# Patient Record
Sex: FEMALE | Race: BLACK OR AFRICAN AMERICAN | NOT HISPANIC OR LATINO | Employment: FULL TIME | URBAN - METROPOLITAN AREA
[De-identification: names, ages, dates, MRNs, and addresses within clinical notes are randomized per-mention and may not be internally consistent; named-entity substitution may affect disease eponyms.]

---

## 2017-05-11 ENCOUNTER — OFFICE VISIT (OUTPATIENT)
Dept: FAMILY MEDICINE | Facility: CLINIC | Age: 37
End: 2017-05-11
Payer: COMMERCIAL

## 2017-05-11 VITALS
DIASTOLIC BLOOD PRESSURE: 80 MMHG | OXYGEN SATURATION: 100 % | TEMPERATURE: 97 F | HEIGHT: 64 IN | BODY MASS INDEX: 18.1 KG/M2 | SYSTOLIC BLOOD PRESSURE: 115 MMHG | HEART RATE: 70 BPM | RESPIRATION RATE: 16 BRPM | WEIGHT: 106 LBS

## 2017-05-11 DIAGNOSIS — R30.0 DYSURIA: ICD-10-CM

## 2017-05-11 DIAGNOSIS — M54.42 CHRONIC MIDLINE LOW BACK PAIN WITH LEFT-SIDED SCIATICA: ICD-10-CM

## 2017-05-11 DIAGNOSIS — B37.0 ORAL THRUSH: ICD-10-CM

## 2017-05-11 DIAGNOSIS — G89.29 CHRONIC MIDLINE LOW BACK PAIN WITH LEFT-SIDED SCIATICA: ICD-10-CM

## 2017-05-11 DIAGNOSIS — B20 HUMAN IMMUNODEFICIENCY VIRUS (HIV) DISEASE (H): Primary | ICD-10-CM

## 2017-05-11 DIAGNOSIS — N30.00 ACUTE CYSTITIS WITHOUT HEMATURIA: ICD-10-CM

## 2017-05-11 LAB
ALBUMIN UR-MCNC: NEGATIVE MG/DL
APPEARANCE UR: CLEAR
BACTERIA #/AREA URNS HPF: ABNORMAL /HPF
BILIRUB UR QL STRIP: NEGATIVE
COLOR UR AUTO: YELLOW
GLUCOSE UR STRIP-MCNC: NEGATIVE MG/DL
HGB UR QL STRIP: ABNORMAL
KETONES UR STRIP-MCNC: NEGATIVE MG/DL
LEUKOCYTE ESTERASE UR QL STRIP: ABNORMAL
NITRATE UR QL: NEGATIVE
NON-SQ EPI CELLS #/AREA URNS LPF: ABNORMAL /LPF
PH UR STRIP: 6 PH (ref 5–7)
RBC #/AREA URNS AUTO: ABNORMAL /HPF (ref 0–2)
SP GR UR STRIP: 1.02 (ref 1–1.03)
URN SPEC COLLECT METH UR: ABNORMAL
UROBILINOGEN UR STRIP-ACNC: 0.2 EU/DL (ref 0.2–1)
WBC #/AREA URNS AUTO: ABNORMAL /HPF (ref 0–2)

## 2017-05-11 PROCEDURE — 87086 URINE CULTURE/COLONY COUNT: CPT | Performed by: INTERNAL MEDICINE

## 2017-05-11 PROCEDURE — 81001 URINALYSIS AUTO W/SCOPE: CPT | Performed by: INTERNAL MEDICINE

## 2017-05-11 PROCEDURE — 99203 OFFICE O/P NEW LOW 30 MIN: CPT | Performed by: INTERNAL MEDICINE

## 2017-05-11 RX ORDER — SULFAMETHOXAZOLE/TRIMETHOPRIM 800-160 MG
1 TABLET ORAL 2 TIMES DAILY
Qty: 6 TABLET | Refills: 0 | Status: SHIPPED | OUTPATIENT
Start: 2017-05-11 | End: 2017-05-14

## 2017-05-11 RX ORDER — NYSTATIN 100000/ML
500000 SUSPENSION, ORAL (FINAL DOSE FORM) ORAL 4 TIMES DAILY
Qty: 280 ML | Refills: 1 | Status: SHIPPED | OUTPATIENT
Start: 2017-05-11 | End: 2017-05-25

## 2017-05-11 ASSESSMENT — ENCOUNTER SYMPTOMS
VOMITING: 0
PALPITATIONS: 0
NAUSEA: 0
FREQUENCY: 0
COUGH: 0
ABDOMINAL PAIN: 0
CHILLS: 0
WEIGHT LOSS: 0
HEMATURIA: 0
FLANK PAIN: 0
DIARRHEA: 0
DYSURIA: 1
SORE THROAT: 0
BLOOD IN STOOL: 0
FEVER: 0
SHORTNESS OF BREATH: 0

## 2017-05-11 NOTE — NURSING NOTE
"Chief Complaint   Patient presents with     Medication Problem   HIV meds making her feel different mentally    initial /80 (BP Location: Left arm, Cuff Size: Adult Regular)  Pulse 70  Temp 97  F (36.1  C) (Oral)  Resp 16  Ht 5' 3.5\" (1.613 m)  Wt 106 lb (48.1 kg)  SpO2 100%  BMI 18.48 kg/m2 Estimated body mass index is 18.48 kg/(m^2) as calculated from the following:    Height as of this encounter: 5' 3.5\" (1.613 m).    Weight as of this encounter: 106 lb (48.1 kg).  BP completed using cuff size: regular.  L  arm      Health Maintenance that is potentially due pending provider review:  NONE    n/a    Etienne Montesinos ma  "

## 2017-05-11 NOTE — MR AVS SNAPSHOT
After Visit Summary   5/11/2017    Krystina Kinney    MRN: [id number]           Patient Information     Date Of Birth          1980        Visit Information        Provider Department      5/11/2017 9:15 AM Open, Assignments; Johnny Vinson MD [facility]        Today's Diagnoses     Human immunodeficiency virus (HIV) disease (H)    -  1    Oral thrush        Dysuria        Chronic midline low back pain with left-sided sciatica          Care Instructions    Follow up with Infectious Disease specialist.    Follow up with Physical Therapy and Occupational Therapy.    Call doctor if your urination symptoms persist/worsens, or if you develop new symptoms.        Follow-ups after your visit        Additional Services     [facility] PT, HAND, AND CHIROPRACTIC REFERRAL       **This order will print in the [facility] Scheduling Office**    Physical Therapy, Hand Therapy and Chiropractic Care are available through:    *[facility]  *[facility]  *[facility]    Call one number to schedule at any of the above locations: [phone].    Your provider has referred you to: Physical Therapy at [facility] or [facility]    Indication/Reason for Referral: Low Back Pain  Onset of Illness: years  Therapy Orders: Evaluate and Treat  Special Programs: as recommended by therapist    Special Request:     Tiff Matthew      Additional Comments for the Therapist or Chiropractor:     Please be aware that coverage of these services is subject to the terms and limitations of your health insurance plan.  Call member services at your health plan with any benefit or coverage questions.      Please bring the following to your appointment:    *Your personal calendar for scheduling future appointments  *Comfortable clothing            INFECTIOUS DISEASE REFERRAL       Your provider has referred you to: [facility]: [facility]  [place] [phone]    [email or web address]    Please be aware that coverage of these services is subject to the terms and limitations of your health insurance plan.  Call member services at your health plan with any benefit or coverage questions.      Please bring the following with you to your appointment:    (1) Any X-Rays, CTs or MRIs which have been performed.  Contact the facility where they were done to arrange for  prior to your scheduled appointment.    (2) List of current medications   (3) This referral request   (4) Any documents/labs given to you for this referral            OCCUPATIONAL THERAPY REFERRAL       *This therapy referral will be filtered to a centralized scheduling office at [facility] and the patient will receive a call to schedule an appointment at a [facility] location most convenient for them. *     [facility] provides Occupational Therapy evaluation and treatment and many specialty services across the [facility] system.  If requesting a specialty program, please choose from the list below.    If you have not heard from the scheduling office within 2 business days, please call [phone] for all locations, with the exception of [place], please call [phone].     Treatment: Evaluation & Treatment  Special Instructions/Modalities: assess for capability for work  Special Programs: workability assessment    Please be aware that coverage of these services is subject to the terms and limitations of your health insurance plan.  Call member services at your health plan with any benefit or coverage questions.      **Note to Provider:  If you are referring outside of [facility] for the therapy appointment, please list the name of the location in the  special instructions  above, print the referral and give to the patient to schedule the appointment.                  Who to contact     If you have questions or need follow up information about today's clinic  "visit or your schedule please contact [facility] directly at [phone].  Normal or non-critical lab and imaging results will be communicated to you by MyChart, letter or phone within 4 business days after the clinic has received the results. If you do not hear from us within 7 days, please contact the clinic through [facility] or phone. If you have a critical or abnormal lab result, we will notify you by phone as soon as possible.  Submit refill requests through [facility] or call your pharmacy and they will forward the refill request to us. Please allow 3 business days for your refill to be completed.          Additional Information About Your Visit        MyChart Information     [facility] lets you send messages to your doctor, view your test results, renew your prescriptions, schedule appointments and more. To sign up, go to [email or web address] . Click on \"Log in\" on the left side of the screen, which will take you to the Welcome page. Then click on \"Sign up Now\" on the right side of the page.     You will be asked to enter the access code listed below, as well as some personal information. Please follow the directions to create your username and password.     Your access code is: [email or web address]  Expires: 2017 10:23 AM     Your access code will  in 90 days. If you need help or a new code, please call your [facility] clinic or [phone].        Care EveryWhere ID     This is your Care EveryWhere ID. This could be used by other organizations to access your [facility] medical records  [id number]        Your Vitals Were     Pulse Temperature Respirations Height Pulse Oximetry BMI (Body Mass Index)    70 97  F (36.1  C) (Oral) 16 5' 3.5\" (1.613 m) 100% 18.48 kg/m2       Blood Pressure from Last 3 Encounters:   17 115/80    Weight from Last 3 Encounters:   17 106 lb (48.1 kg)              We Performed the Following     [facility] PT, HAND, AND CHIROPRACTIC REFERRAL     INFECTIOUS DISEASE " REFERRAL     OCCUPATIONAL THERAPY REFERRAL     UA reflex to Microscopic and Culture          Today's Medication Changes          These changes are accurate as of: 5/11/17 10:23 AM.  If you have any questions, ask your nurse or doctor.               Start taking these medicines.        Dose/Directions    nystatin 711688 UNIT/ML suspension   Commonly known as:  MYCOSTATIN   Used for:  Oral thrush   Started by:  Johnny Vinson MD        Dose:  372348 Units   Take 5 mLs (500,000 Units) by mouth 4 times daily for 14 days   Quantity:  280 mL   Refills:  1            Where to get your medicines      These medications were sent to [facility] [place], [place] - [address]  [address], [facility] [address]     Phone:  [phone]     efavirenz-emtrictabine-tenofovir 600-200-300 MG per tablet    nystatin [id number] UNIT/ML suspension                Primary Care Provider    None Specified       No primary provider on file.        Thank you!     Thank you for choosing [facility]  for your care. Our goal is always to provide you with excellent care. Hearing back from our patients is one way we can continue to improve our services. Please take a few minutes to complete the written survey that you may receive in the mail after your visit with us. Thank you!             Your Updated Medication List - Protect others around you: Learn how to safely use, store and throw away your medicines at www.disposemymeds.org.          This list is accurate as of: 5/11/17 10:23 AM.  Always use your most recent med list.                   Brand Name Dispense Instructions for use    efavirenz-emtrictabine-tenofovir 600-200-300 MG per tablet    ATRIPLA    30 tablet    Take 1 tablet by mouth At Bedtime       nystatin [id number] UNIT/ML suspension    MYCOSTATIN    280 mL    Take 5 mLs (500,000 Units) by mouth 4 times daily for 14 days       UNKNOWN MED DOSAGE

## 2017-05-11 NOTE — PATIENT INSTRUCTIONS
Follow up with Infectious Disease specialist.    Follow up with Physical Therapy and Occupational Therapy.    Call doctor if your urination symptoms persist/worsens, or if you develop new symptoms.

## 2017-05-11 NOTE — PROGRESS NOTES
"HPI Comments:   Patient used to follow up at [facility], but her insurance coverage reportedly changed and she would need to follow up within our network.    She has a history of HIV and is currently on Atripla.  She has been on this medication for the past 2 months and she complains of unpleasant side effects such as nightmares. Would like to switch to a different medication. Has not recently seen an infectious disease specialist.    Also requesting a disability form filled out. She cites her chronic low back pain secondary to degenerative disc disease as the cause for her inability to work. She was undergoing physical therapy which was helping but this stopped given the patient's change in insurance. The pain radiates to her left lower extremity. No urinary/bowel incontinence. No saddle anesthesia. No weakness/numbness of the left leg.      Past Medical History:   Diagnosis Date     Human immunodeficiency virus (HIV) disease (H) 5/11/2017       Review of Systems   Constitutional: Positive for malaise/fatigue. Negative for chills, fever and weight loss.   HENT: Negative for congestion and sore throat.    Respiratory: Negative for cough and shortness of breath.    Cardiovascular: Negative for chest pain and palpitations.   Gastrointestinal: Negative for abdominal pain, blood in stool, diarrhea, melena, nausea and vomiting.   Genitourinary: Positive for dysuria. Negative for flank pain, frequency, hematuria and urgency.   Skin: Negative for rash.       /80 (BP Location: Left arm, Cuff Size: Adult Regular)  Pulse 70  Temp 97  F (36.1  C) (Oral)  Resp 16  Ht 5' 3.5\" (1.613 m)  Wt 106 lb (48.1 kg)  SpO2 100%  BMI 18.48 kg/m2      Physical Exam   Constitutional: She is oriented to person, place, and time. No distress.   HENT:   Mouth/Throat: No oropharyngeal exudate.   (+) oral thrush   Eyes: Conjunctivae are normal. Pupils are equal, round, and reactive to light.   Neck: No thyromegaly present. "   Cardiovascular: Normal rate, regular rhythm and normal heart sounds.    Pulmonary/Chest: Effort normal and breath sounds normal. No respiratory distress.   Abdominal: Soft. There is no tenderness.   Lymphadenopathy:     She has no cervical adenopathy.   Neurological: She is alert and oriented to person, place, and time. GCS score is 15.   Psychiatric: Mood and affect normal.   Vitals reviewed.        ICD-10-CM    1. Human immunodeficiency virus (HIV) disease (H) B20 INFECTIOUS DISEASE REFERRAL     efavirenz-emtrictabine-tenofovir (ATRIPLA) 600-200-300 MG per tablet   2. Chronic midline low back pain with left-sided sciatica M54.42 OCCUPATIONAL THERAPY REFERRAL    G89.29 [facility] PT, HAND, AND CHIROPRACTIC REFERRAL   3. Oral thrush B37.0 nystatin (MYCOSTATIN) 752016 UNIT/ML suspension   4. Dysuria R30.0 UA reflex to Microscopic and Culture         Patient Instructions   Follow up with Infectious Disease specialist.    Follow up with Physical Therapy and Occupational Therapy.    Call doctor if your urination symptoms persist/worsens, or if you develop new symptoms.

## 2017-05-12 LAB
BACTERIA SPEC CULT: NORMAL
MICRO REPORT STATUS: NORMAL
SPECIMEN SOURCE: NORMAL

## 2017-05-24 ENCOUNTER — TRANSFERRED RECORDS (OUTPATIENT)
Dept: HEALTH INFORMATION MANAGEMENT | Facility: CLINIC | Age: 37
End: 2017-05-24

## 2017-05-25 ENCOUNTER — TELEPHONE (OUTPATIENT)
Dept: FAMILY MEDICINE | Facility: CLINIC | Age: 37
End: 2017-05-25

## 2017-05-25 DIAGNOSIS — N30.00 ACUTE CYSTITIS WITHOUT HEMATURIA: ICD-10-CM

## 2017-05-25 RX ORDER — SULFAMETHOXAZOLE/TRIMETHOPRIM 800-160 MG
1 TABLET ORAL 2 TIMES DAILY
Qty: 6 TABLET | Refills: 0 | OUTPATIENT
Start: 2017-05-25

## 2017-05-25 NOTE — TELEPHONE ENCOUNTER
I would need more information before I could refill this  It is not on her medication list, but she may need it for PCP prophylaxis  She may get it from her ID doctor  Can you call the patient and get more information

## 2017-05-25 NOTE — TELEPHONE ENCOUNTER
Fax refill from [facility] for Sulfamethoxazole    Med not pended as it was not on active list    Sulfamethoxazole      Last Written Prescription Date:  5/11/17 Ended 5/14/17  Last Fill Quantity: 6,   # refills: 0  Last Office Visit with G, CORIEP or [facility] prescribing provider: 5/11/17  Future Office visit:       Routing refill request to provider for review/approval because:  Drug not active on patient's medication list    Marivel Jacome RT(R)

## 2017-06-07 ENCOUNTER — TELEPHONE (OUTPATIENT)
Dept: FAMILY MEDICINE | Facility: CLINIC | Age: 37
End: 2017-06-07

## 2017-06-07 NOTE — TELEPHONE ENCOUNTER
Chronic Illness Visit    Today's Date:  2017   Patient's Name: Lee Craft   Patient's :  1973     History:     Chief Complaint   Patient presents with    Follow Up Chronic Condition     pt here for follow up chronic conditions    Nail Problem     pt c/o having toe nail problems questionable fungus       Lee Craft is a 37 y.o. female presenting for a follow up of Chronic Illness. Migraine Headaches    Patient reports unilateral migraine with photophobia, sensitivity to sound, and nausea/vomiting  Denies missing work or other activities due to headaches  Headaches are self resolving  Taking the following meds for this problem: stable on zomig and imitrex prn    Depression    Patient denies new stressors  Sypmtoms are stable since last visit. Patient is currently on the following for depression and doing well: effexor  Denies side effects. Patient is not in counseling. Denies Any thoughts of harming herself or others. Denies excessive drug or alcohol use. Patient reports a supportive and safe home environment.     Bilateral Toe Nail/Foot Fungus    Onset: 2-3 months  Reports she is very active and runs  She also gets regular pedicures  She noticed discolorated and brittle nails   Her skin is also dry/cracked and scaly  No home treatments    Past Medical History   Diagnosis Date    Asthma     Contact dermatitis and other eczema, due to unspecified cause      ezcema    Depression     Headache      migraines-[facility] with Neuro [facility]    Heart murmur     HX OTHER MEDICAL      menieres disease    Hypothyroid     Nodule of vagina 2016     nodule vaginal cuff- repeat US in 3-6 mths    OJAN on CPAP     Thyroid disease      HYPOTHYROIDISM     Past Surgical History   Procedure Laterality Date    Hx hysterectomy       also uterine polyp    Pr lap,cholecystectomy N/A 08/15/2016     Dr. Alvaro Sequeira Pr abdomen surgery proc unlisted      Hx cholecystectomy  2016     Social I left message for Gretchen at [place] [facility] to call back and get more specifics about what information she is wanting.     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  venlafaxine-SR (EFFEXOR XR) 75 mg capsule Take 1 Cap by mouth daily.  SUMAtriptan (IMITREX) 50 mg tablet Take 1 Tab by mouth once as needed for Migraine for up to 1 dose.  ZOLMitriptan (ZOMIG) 5 mg tablet Take 1 Tab by mouth as needed for Migraine.  terbinafine HCl (LAMISIL) 250 mg tablet Take 1 Tab by mouth daily.  linaclotide (LINZESS) 145 mcg cap capsule Take 145 mcg by mouth Daily (before breakfast).  multivitamin (ONE A DAY) tablet Take 1 Tab by mouth daily.  ibuprofen (MOTRIN) 800 mg tablet Take 1 Tab by mouth three (3) times daily as needed for Pain.  albuterol (PROVENTIL HFA, VENTOLIN HFA, PROAIR HFA) 90 mcg/actuation inhaler Take 2 puffs by inhalation every four (4) hours as needed for Wheezing or Shortness of Breath.  docusate sodium (COLACE) 100 mg capsule Take 100 mg by mouth two (2) times a day.     oxyCODONE-acetaminophen (PERCOCET) 5-325 mg per tablet 1-2 tablets every 4-6 hours prn pain    estradiol (MINIVELLE) 0.05 mg/24 hr 1 patch by TransDERmal route Every Saturday. No current facility-administered medications for this visit. Constitutional: negative for fevers, chills, sweats and fatigue  Respiratory: negative for cough, sputum or wheezing  Cardiovascular: negative for chest pain, chest pressure/discomfort, dyspnea  Gastrointestinal: negative for nausea, vomiting, diarrhea, constipation and abdominal pain  Musculoskeletal:negative for myalgias and arthralgias  Neurological: negative for headaches and dizziness  Behavioral/Psych: negative for anxiety and depression    Physical Assessment:   VS:    Visit Vitals    /87 (BP 1 Location: Left arm, BP Patient Position: Sitting)    Pulse 92    Temp 98 °F (36.7 °C) (Oral)    Resp 20    Ht 5' 3\" (1.6 m)    Wt 211 lb (95.7 kg)    SpO2 99%    BMI 37.38 kg/m2       Lab Results   Component Value Date/Time    Sodium 142 08/12/2016 01:30 PM    Potassium 4.1 08/12/2016 01:30 PM    Chloride 106 08/12/2016 01:30 PM    CO2 28 08/12/2016 01:30 PM    Anion gap 8 08/12/2016 01:30 PM    Glucose 113 08/12/2016 01:30 PM    BUN 10 08/12/2016 01:30 PM    Creatinine 0.71 08/12/2016 01:30 PM    BUN/Creatinine ratio 14 08/12/2016 01:30 PM    GFR est AA >60 08/12/2016 01:30 PM    GFR est non-AA >60 08/12/2016 01:30 PM    Calcium 8.9 08/12/2016 01:30 PM       General:   Well-groomed, obese, in no distress, pleasant, alert, appropriate and conversant. Mouth:  Good dentition, oropharynx WNL without membranes, exudates, petechiae or ulcers  Neck:   Neck supple, no swelling, mass or tenderness, no thyromegaly  Cardiovasc:   RRR, no MRG. Pulses 2+ and symmetric at distal extremities. Pulmonary:   Lungs clear bilaterally. Normal respiratory effort. Extremities:   No edema, no TTP bilateral calves. LEs warm and well-perfused. Neuro:   Alert and oriented, CNs II-XII intact, no focal deficits.  No facial asymmetry noted. Skin:    No rashes or jaundice  MSK:   Normal ROM, 5/5 muscle strength, bilateral toe nail and foot fungus present. Psych:  No pressured speech or abnormal thought content        Assessment/Plan & Orders:       1. Moderate episode of recurrent major depressive disorder ([address]    2. Hypothyroidism, unspecified type    3. Nonintractable migraine, unspecified migraine type    4. Nail fungal infection    5. Ingrown toenail        Orders Placed This Encounter    LIPID PANEL    METABOLIC PANEL, COMPREHENSIVE    TSH 3RD GENERATION    REFERRAL TO PODIATRY    levothyroxine (SYNTHROID) 150 mcg tablet    venlafaxine-SR (EFFEXOR XR) 75 mg capsule    SUMAtriptan (IMITREX) 50 mg tablet    ZOLMitriptan (ZOMIG) 5 mg tablet    terbinafine HCl (LAMISIL) 250 mg tablet     Depression stable continue with current meds. CMP and Lipid panel ordered. Migraines stable will refill current meds  Nail fungus/Ingrown Toenails will send in lamisil and Ref to Podiatry    Follow up in 2-3 months    *Plan of care reviewed with patient. Patient in agreement with plan and expresses understanding. All questions answered and patient encouraged to call or RTO if further questions or concerns.     Susan Sales MD  Family Medicine  1/16/2017  10:26 AM

## 2017-06-07 NOTE — TELEPHONE ENCOUNTER
Gretchen mccarty [facility] phoned clinic.  Wanted to clarify who pt has been seeing and who will be following for HIV.      I told her has seen Dr. Vinson once and from his 5-11-17 office visit note:     HPI Comments:   Patient used to follow up at [facility], but her insurance coverage reportedly changed and she would need to follow up within our network.     She has a history of HIV and is currently on Atripla.  She has been on this medication for the past 2 months and she complains of unpleasant side effects such as nightmares. Would like to switch to a different medication. Has not recently seen an infectious disease specialist.    Also told referred to Inf Disease Specialist [facility].    Bessy EDOUARD MA

## 2017-06-07 NOTE — TELEPHONE ENCOUNTER
Reason for Call:  Other call back    Detailed comments: [place] [facility] is calling about.  Need to speak to him or nurse about special care.  Pt went to a different clinic and they need to figure out  What is going on. The other clinic found the care everywhere      Phone Number Patient can be reached at: [phone] - [email or web address]  Please give her a call back.     Best Time: any    Can we leave a detailed message on this number? YES    Call taken on 6/7/2017 at 9:36 AM by Dana Ernst

## 2018-01-07 ENCOUNTER — HEALTH MAINTENANCE LETTER (OUTPATIENT)
Age: 38
End: 2018-01-07

## 2018-03-19 ENCOUNTER — TRANSFERRED RECORDS (OUTPATIENT)
Dept: HEALTH INFORMATION MANAGEMENT | Facility: CLINIC | Age: 38
End: 2018-03-19

## 2018-09-18 ENCOUNTER — TRANSFERRED RECORDS (OUTPATIENT)
Dept: HEALTH INFORMATION MANAGEMENT | Facility: CLINIC | Age: 38
End: 2018-09-18

## 2019-03-26 ENCOUNTER — TRANSFERRED RECORDS (OUTPATIENT)
Dept: HEALTH INFORMATION MANAGEMENT | Facility: CLINIC | Age: 39
End: 2019-03-26

## 2019-06-03 ENCOUNTER — TRANSFERRED RECORDS (OUTPATIENT)
Dept: HEALTH INFORMATION MANAGEMENT | Facility: CLINIC | Age: 39
End: 2019-06-03

## 2019-06-14 ENCOUNTER — APPOINTMENT (OUTPATIENT)
Dept: CT IMAGING | Facility: CLINIC | Age: 39
DRG: 802 | End: 2019-06-14
Attending: EMERGENCY MEDICINE
Payer: COMMERCIAL

## 2019-06-14 ENCOUNTER — HOSPITAL ENCOUNTER (INPATIENT)
Facility: CLINIC | Age: 39
LOS: 7 days | Discharge: HOME | DRG: 802 | End: 2019-06-21
Attending: EMERGENCY MEDICINE | Admitting: HOSPITALIST
Payer: COMMERCIAL

## 2019-06-14 DIAGNOSIS — L04.9 SUPPURATIVE LYMPHADENITIS: ICD-10-CM

## 2019-06-14 DIAGNOSIS — R91.8 LUNG MASS: ICD-10-CM

## 2019-06-14 PROBLEM — A18.2: Status: ACTIVE | Noted: 2019-06-14

## 2019-06-14 LAB
ANION GAP SERPL CALCULATED.3IONS-SCNC: 1 MMOL/L (ref 3–14)
BASOPHILS # BLD AUTO: 0 10E9/L (ref 0–0.2)
BASOPHILS NFR BLD AUTO: 0.3 %
BUN SERPL-MCNC: 10 MG/DL (ref 7–30)
CALCIUM SERPL-MCNC: 9.1 MG/DL (ref 8.5–10.1)
CHLORIDE SERPL-SCNC: 104 MMOL/L (ref 94–109)
CO2 SERPL-SCNC: 32 MMOL/L (ref 20–32)
CREAT SERPL-MCNC: 0.59 MG/DL (ref 0.52–1.04)
CRP SERPL-MCNC: 107 MG/L (ref 0–8)
DIFFERENTIAL METHOD BLD: ABNORMAL
EOSINOPHIL # BLD AUTO: 0.2 10E9/L (ref 0–0.7)
EOSINOPHIL NFR BLD AUTO: 2.1 %
ERYTHROCYTE [DISTWIDTH] IN BLOOD BY AUTOMATED COUNT: 14.5 % (ref 10–15)
GFR SERPL CREATININE-BSD FRML MDRD: >90 ML/MIN/{1.73_M2}
GLUCOSE SERPL-MCNC: 97 MG/DL (ref 70–99)
HCT VFR BLD AUTO: 33.9 % (ref 35–47)
HGB BLD-MCNC: 11 G/DL (ref 11.7–15.7)
IMM GRANULOCYTES # BLD: 0 10E9/L (ref 0–0.4)
IMM GRANULOCYTES NFR BLD: 0.5 %
LYMPHOCYTES # BLD AUTO: 1.3 10E9/L (ref 0.8–5.3)
LYMPHOCYTES NFR BLD AUTO: 17.2 %
MCH RBC QN AUTO: 30.9 PG (ref 26.5–33)
MCHC RBC AUTO-ENTMCNC: 32.4 G/DL (ref 31.5–36.5)
MCV RBC AUTO: 95 FL (ref 78–100)
MONOCYTES # BLD AUTO: 0.9 10E9/L (ref 0–1.3)
MONOCYTES NFR BLD AUTO: 11.1 %
NEUTROPHILS # BLD AUTO: 5.4 10E9/L (ref 1.6–8.3)
NEUTROPHILS NFR BLD AUTO: 68.8 %
NRBC # BLD AUTO: 0 10*3/UL
NRBC BLD AUTO-RTO: 0 /100
PLATELET # BLD AUTO: 366 10E9/L (ref 150–450)
POTASSIUM SERPL-SCNC: 3.8 MMOL/L (ref 3.4–5.3)
RBC # BLD AUTO: 3.56 10E12/L (ref 3.8–5.2)
SODIUM SERPL-SCNC: 137 MMOL/L (ref 133–144)
WBC # BLD AUTO: 7.8 10E9/L (ref 4–11)

## 2019-06-14 PROCEDURE — 25800030 ZZH RX IP 258 OP 636: Performed by: HOSPITALIST

## 2019-06-14 PROCEDURE — 25000128 H RX IP 250 OP 636: Performed by: EMERGENCY MEDICINE

## 2019-06-14 PROCEDURE — 96374 THER/PROPH/DIAG INJ IV PUSH: CPT | Mod: 59

## 2019-06-14 PROCEDURE — 86140 C-REACTIVE PROTEIN: CPT | Performed by: EMERGENCY MEDICINE

## 2019-06-14 PROCEDURE — 80048 BASIC METABOLIC PNL TOTAL CA: CPT | Performed by: EMERGENCY MEDICINE

## 2019-06-14 PROCEDURE — 25000132 ZZH RX MED GY IP 250 OP 250 PS 637: Performed by: HOSPITALIST

## 2019-06-14 PROCEDURE — 85025 COMPLETE CBC W/AUTO DIFF WBC: CPT | Performed by: EMERGENCY MEDICINE

## 2019-06-14 PROCEDURE — 99223 1ST HOSP IP/OBS HIGH 75: CPT | Mod: AI | Performed by: HOSPITALIST

## 2019-06-14 PROCEDURE — 12000000 ZZH R&B MED SURG/OB

## 2019-06-14 PROCEDURE — 70491 CT SOFT TISSUE NECK W/DYE: CPT

## 2019-06-14 PROCEDURE — 99285 EMERGENCY DEPT VISIT HI MDM: CPT | Mod: 25

## 2019-06-14 PROCEDURE — 71250 CT THORAX DX C-: CPT

## 2019-06-14 RX ORDER — POTASSIUM CL/LIDO/0.9 % NACL 10MEQ/0.1L
10 INTRAVENOUS SOLUTION, PIGGYBACK (ML) INTRAVENOUS
Status: DISCONTINUED | OUTPATIENT
Start: 2019-06-14 | End: 2019-06-14

## 2019-06-14 RX ORDER — POTASSIUM CHLORIDE 7.45 MG/ML
10 INJECTION INTRAVENOUS
Status: DISCONTINUED | OUTPATIENT
Start: 2019-06-14 | End: 2019-06-14

## 2019-06-14 RX ORDER — HYDROCODONE BITARTRATE AND ACETAMINOPHEN 5; 325 MG/1; MG/1
1-2 TABLET ORAL EVERY 4 HOURS PRN
Status: DISCONTINUED | OUTPATIENT
Start: 2019-06-14 | End: 2019-06-21 | Stop reason: HOSPADM

## 2019-06-14 RX ORDER — IOPAMIDOL 755 MG/ML
90 INJECTION, SOLUTION INTRAVASCULAR ONCE
Status: COMPLETED | OUTPATIENT
Start: 2019-06-14 | End: 2019-06-14

## 2019-06-14 RX ORDER — SODIUM CHLORIDE 9 MG/ML
INJECTION, SOLUTION INTRAVENOUS CONTINUOUS
Status: DISCONTINUED | OUTPATIENT
Start: 2019-06-14 | End: 2019-06-16

## 2019-06-14 RX ORDER — ONDANSETRON 2 MG/ML
4 INJECTION INTRAMUSCULAR; INTRAVENOUS EVERY 6 HOURS PRN
Status: DISCONTINUED | OUTPATIENT
Start: 2019-06-14 | End: 2019-06-21 | Stop reason: HOSPADM

## 2019-06-14 RX ORDER — AMOXICILLIN 250 MG
2 CAPSULE ORAL 2 TIMES DAILY
Status: DISCONTINUED | OUTPATIENT
Start: 2019-06-14 | End: 2019-06-21 | Stop reason: HOSPADM

## 2019-06-14 RX ORDER — BISACODYL 10 MG
10 SUPPOSITORY, RECTAL RECTAL DAILY PRN
Status: DISCONTINUED | OUTPATIENT
Start: 2019-06-14 | End: 2019-06-21 | Stop reason: HOSPADM

## 2019-06-14 RX ORDER — MORPHINE SULFATE 4 MG/ML
4 INJECTION, SOLUTION INTRAMUSCULAR; INTRAVENOUS
Status: DISCONTINUED | OUTPATIENT
Start: 2019-06-14 | End: 2019-06-14

## 2019-06-14 RX ORDER — EMTRICITABINE AND TENOFOVIR ALAFENAMIDE 200; 25 MG/1; MG/1
1 TABLET ORAL DAILY
Refills: 2 | COMMUNITY
Start: 2019-06-02

## 2019-06-14 RX ORDER — POTASSIUM CHLORIDE 1500 MG/1
20-40 TABLET, EXTENDED RELEASE ORAL
Status: DISCONTINUED | OUTPATIENT
Start: 2019-06-14 | End: 2019-06-14

## 2019-06-14 RX ORDER — LIDOCAINE 40 MG/G
CREAM TOPICAL
Status: DISCONTINUED | OUTPATIENT
Start: 2019-06-14 | End: 2019-06-21 | Stop reason: HOSPADM

## 2019-06-14 RX ORDER — ACETAMINOPHEN 325 MG/1
650 TABLET ORAL EVERY 4 HOURS PRN
Status: DISCONTINUED | OUTPATIENT
Start: 2019-06-14 | End: 2019-06-21 | Stop reason: HOSPADM

## 2019-06-14 RX ORDER — AMOXICILLIN 250 MG
1 CAPSULE ORAL 2 TIMES DAILY
Status: DISCONTINUED | OUTPATIENT
Start: 2019-06-14 | End: 2019-06-21 | Stop reason: HOSPADM

## 2019-06-14 RX ORDER — POTASSIUM CHLORIDE 1.5 G/1.58G
20-40 POWDER, FOR SOLUTION ORAL
Status: DISCONTINUED | OUTPATIENT
Start: 2019-06-14 | End: 2019-06-14

## 2019-06-14 RX ORDER — AMOXICILLIN 250 MG
2 CAPSULE ORAL 2 TIMES DAILY PRN
Status: DISCONTINUED | OUTPATIENT
Start: 2019-06-14 | End: 2019-06-21 | Stop reason: HOSPADM

## 2019-06-14 RX ORDER — POTASSIUM CHLORIDE 29.8 MG/ML
20 INJECTION INTRAVENOUS
Status: DISCONTINUED | OUTPATIENT
Start: 2019-06-14 | End: 2019-06-14

## 2019-06-14 RX ORDER — PYRAZINAMIDE TABLET 500 MG/1
500 TABLET ORAL DAILY
COMMUNITY
End: 2019-06-14

## 2019-06-14 RX ORDER — AMOXICILLIN 250 MG
1 CAPSULE ORAL 2 TIMES DAILY PRN
Status: DISCONTINUED | OUTPATIENT
Start: 2019-06-14 | End: 2019-06-21 | Stop reason: HOSPADM

## 2019-06-14 RX ORDER — MAGNESIUM SULFATE HEPTAHYDRATE 40 MG/ML
4 INJECTION, SOLUTION INTRAVENOUS EVERY 4 HOURS PRN
Status: DISCONTINUED | OUTPATIENT
Start: 2019-06-14 | End: 2019-06-21 | Stop reason: HOSPADM

## 2019-06-14 RX ORDER — DOLUTEGRAVIR SODIUM 50 MG/1
50 TABLET, FILM COATED ORAL DAILY
Refills: 0 | COMMUNITY
Start: 2019-06-03

## 2019-06-14 RX ORDER — ONDANSETRON 4 MG/1
4 TABLET, ORALLY DISINTEGRATING ORAL EVERY 6 HOURS PRN
Status: DISCONTINUED | OUTPATIENT
Start: 2019-06-14 | End: 2019-06-21 | Stop reason: HOSPADM

## 2019-06-14 RX ORDER — NALOXONE HYDROCHLORIDE 0.4 MG/ML
.1-.4 INJECTION, SOLUTION INTRAMUSCULAR; INTRAVENOUS; SUBCUTANEOUS
Status: DISCONTINUED | OUTPATIENT
Start: 2019-06-14 | End: 2019-06-21 | Stop reason: HOSPADM

## 2019-06-14 RX ADMIN — MORPHINE SULFATE 4 MG: 4 INJECTION INTRAVENOUS at 15:09

## 2019-06-14 RX ADMIN — SODIUM CHLORIDE: 9 INJECTION, SOLUTION INTRAVENOUS at 21:30

## 2019-06-14 RX ADMIN — SENNOSIDES AND DOCUSATE SODIUM 1 TABLET: 8.6; 5 TABLET ORAL at 21:30

## 2019-06-14 RX ADMIN — IOPAMIDOL 90 ML: 755 INJECTION, SOLUTION INTRAVENOUS at 16:15

## 2019-06-14 ASSESSMENT — ENCOUNTER SYMPTOMS
WOUND: 1
TROUBLE SWALLOWING: 0
COUGH: 0
SHORTNESS OF BREATH: 0

## 2019-06-14 ASSESSMENT — ACTIVITIES OF DAILY LIVING (ADL): ADLS_ACUITY_SCORE: 15

## 2019-06-14 NOTE — ED TRIAGE NOTES
Patient reports right sided neck pain for one month. Patient has a large lump on her right scapula.

## 2019-06-14 NOTE — ED PROVIDER NOTES
History     Chief Complaint:  Neck Pain and Wound Check    The history is provided by a relative and the patient.   Her brother acted as a .    Krystina Kinney is a 39 year old female who presents to the emergency department today for evaluation of neck pain. The patient has had right-sided neck pain and a growing lump for about one month. She just came here from [facility], and these symptoms began while she was there. She has never had this before. A doctor in [place] said this was TB; she was prescribed pyrazinamide . No cough, difficulty breathing, difficulty swallowing. She has taken Advil for pain.    Hx somewhat limited given language barrier    Cytopathology Report; [facility]  Dr. Florecita Cole   Clinical finding: Right low cervical lump  Gross: Firm, well defined, right lower cervical lump 3.5 cm   Asp: Pusy  Microscopy: Smear shows sheets of neutrophils, degeneration, foamy and debri-laden macrophages and ghosty degenerated epithelioid histocytes.  Diagnosis: Chronic suppurative lymphadenitis suggestive of tuberculosis  *Note: This note was from 2011. The patient was seen again in 2019 for the same thing and was put on Pyrazinamide.     Allergies:  No Known Drug Allergies    Medications:    efavirenz-emtrictabine-tenofovir (ATRIPLA) 600-200-300 MG per tablet  Pyrazinamide    Past Medical History:    HIV    Past Surgical History:    History reviewed. No pertinent surgical history.    Family History:    History reviewed. No pertinent family history.    Social History:  The patient was accompanied to the ED by her brother.  Smoking Status: Never Smoker  Smokeless Tobacco: Never Used  Alcohol Use: Positive   Marital Status:  Single     Review of Systems   HENT: Negative for trouble swallowing.    Respiratory: Negative for cough and shortness of breath.    Skin: Positive for wound.   All other systems reviewed and are negative.    Physical Exam     Patient Vitals for the past 24  hrs:   BP Temp Temp src Pulse Resp SpO2 Weight   06/14/19 2021 -- -- -- -- 20 -- --   06/14/19 1949 106/73 99  F (37.2  C) Axillary 121 16 95 % 68.2 kg (150 lb 5.6 oz)   06/14/19 1253 118/77 98.7  F (37.1  C) Temporal 107 16 100 % --      Physical Exam  Constitutional: Well developed, nontox appearance  Head: Atraumatic.   Mouth/Throat: Oropharynx is clear and moist.   Neck:  no stridor, R supraclavicular swelling and tenderness, mild erythema  Eyes: no scleral icterus  Cardiovascular: borderline reg tachycardia, 2+ bilat radial pulses  Pulmonary/Chest: nml resp effort, Clear BS bilat  Abdominal: ND,  soft, NT, no rebound or guarding   Ext: Warm, well perfused, no edema  Neurological: A&O, symmetric facies, moves ext x4  Skin: Skin is warm and dry.   Psychiatric: Behavior is normal. Thought content normal.   Nursing note and vitals reviewed.        Emergency Department Course     Imaging:  Radiology findings were communicated with the patient who voiced understanding of the findings.    Soft tissue neck CT w contrast   1. Confluent necrotic lymph nodes in the lower right neck and right  axilla consistent with scrofula.  Reading per radiology    CT chest noncon  Pending upon admission    Laboratory:  Laboratory findings were communicated with the patient who voiced understanding of the findings.    CBC: WBC 7.8, HGB 11.0,   BMP: Anion Gap 1, o/w WNL (Creatinine 0.59)    Interventions:  1509 Morphine 4 mg IV    Emergency Department Course:    1400 Nursing notes and vitals reviewed.    1430 I performed an exam of the patient as documented above.     1459 IV was inserted and blood was drawn for laboratory testing, results above.     1704 The patient was sent for a CT while in the emergency department, results above.      1550 I spoke with Dr. Toro of Infectious Disease regarding patient's presentation, findings, and plan of care.     1700 The patient was sent for a XR while in the emergency department, results  above.      4690 I personally reviewed the imaging and lab results with the patient and answered all related questions prior to admission.    I spoke with Dr. Barron of the Hospitalist service regarding patient's presentation, findings, and plan of care.     Impression & Plan      Medical Decision Making:  Krystina Kinney is a 39 year old female who presents to the emergency department today for evaluation of R neck mass    DDx includes scrofula, miliary TB, abscess, separative lymphadenitis history history is somewhat convoluted and records are minimal and also from [facility].  Imaging obtained with results as noted above significant for findings concerning for necrotizing lymph nodes, pulmonary findings concerning for TB.  Patient was not initially placed in airborne precautions given she denied any respiratory symptoms but after CT results, patient placed in airborne isolation.  Labs as noted above significant for normal white blood cell count, normal hemoglobin level, normal creatinine.  Discussed patient with infectious disease who recommended admission for further evaluation and management.  Pt's presentation highly concerning for active TB.  CT chest ordered for further evaluation pending upon admission.  Patient was counseled on results, diagnosis and disposition prior to admission.  She is understanding and agreeable to plan.  Patient was subsequently admitted in stable condition.    Diagnosis:      ICD-10-CM    1. Lung mass R91.8 CRP inflammation     CRP inflammation     CANCELED: CRP inflammation   2. Suppurative lymphadenitis L04.9      Disposition:   Admission    Scribe Disclosure:  I, Avtar Lalo, am serving as a scribe at 2:34 PM on 6/14/2019 to document services personally performed by Oliver Mullins MD based on my observations and the provider's statements to me.     EMERGENCY DEPARTMENT       Oliver Mullins MD  06/14/19 9216       Oliver Mullins MD  06/14/19 4791

## 2019-06-14 NOTE — ED NOTES
"[facility] [place]  ED Nurse Handoff Report    ED Chief complaint: Neck Pain and Wound Check      ED Diagnosis:   Final diagnoses:   Lung mass   Suppurative lymphadenitis       Code Status: Full Code    Allergies: No Known Allergies    Activity level - Baseline/Home:  Independent  Activity Level - Current:   Independent    Patient's Preferred language: Palestinian   Needed?: Yes    Isolation: Yes  Infection: Not Applicable  Other   Bariatric?: No    Vital Signs:   Vitals:    06/14/19 1253   BP: 118/77   Pulse: 107   Resp: 16   Temp: 98.7  F (37.1  C)   TempSrc: Temporal   SpO2: 100%       Cardiac Rhythm: ,        Pain level: 0-10 Pain Scale: 7    Is this patient confused?: No   Does this patient have a guardian?  No         If yes, is there guardianship documents in the Epic \"Code/ACP\" activity?  N/A         Guardian Notified?  N/A  [place] - Suicide Severity Rating Scale Completed?  Yes  If yes, what color did the patient score?  White    Patient Report: Initial Complaint: neck pain  Focused Assessment: 39 year old female who presents to the emergency department today for evaluation of neck pain. The patient has had right-sided neck pain and a growing lump for about one month. She just came here from [facility], and these symptoms began while she was there. She has never had this before. A doctor in [place] said this was TB; she was prescribed pyrazinamide . No cough, difficulty breathing, difficulty swallowing. She has taken Advil for pain.      Tests Performed: labs, ct  Abnormal Results:   Results for orders placed or performed during the hospital encounter of 06/14/19   Soft tissue neck CT w contrast    Narrative    CT SCAN OF THE NECK WITH CONTRAST  6/14/2019 5:04 PM     HISTORY: Lower right supraclavicular/anterior cervical swelling,  evaluate abscess, lymphadenitis. History of tuberculosis, original  biopsy of nodes from 2011 showed chronic suppurative lymphadenitis  suggestive of tuberculosis. " Patient being treated with tears and a  mild.    TECHNIQUE: Axial images and coronal reformations. Radiation dose for  this scan was reduced using automated exposure control, adjustment of  the mA and/or kV according to patient size, or iterative  reconstruction technique. 90mL Isovue-370 IV.    COMPARISON: None.    FINDINGS: There is an irregularly-shaped mass in the lower anterior  right neck with irregular peripheral enhancement around low  attenuation center with no calcification. The mass is 4.9 x 5.0 x 5.3  cm diameter and is in the level V lymph node chain. More posteriorly  and inferiorly, there are additional lesions consistent with necrotic  nodes in the supraclavicular fossa, and another cluster similar nodes  are seen high in the right axilla. There is no adenopathy in the left  neck.    The orbits, sinuses, parotid glands, submandibular glands and thyroid  gland appear normal. The tongue and airway appear normal. No bone  lesions are seen.    Scans through the upper lungs show a partially cavitary mass or  infiltrate in the left upper lobe centrally measuring 4.5 x 2.6 x 2.6  cm diameter. In addition, there are multiple air cysts bilaterally  throughout both lungs. Most of these are thin-walled, but a few have  slightly thickened walls.      Impression    IMPRESSION:  1. Confluent necrotic lymph nodes in the lower right neck and right  axilla consistent with scrofula.  2. Partially necrotic left upper lobe mass. Differential diagnosis  includes tuberculosis and lung cancer.  3. Multiple air cysts in the lungs. Differential diagnosis is broad  and includes emphysema, alpha-1 antitrypsin deficiency and  lymphangiomyomatosis.    I called the report to Dr. Cecil Mullins in the emergency room at 5:40  PM on 6/14/2019.   CBC with platelets differential   Result Value Ref Range    WBC 7.8 4.0 - 11.0 10e9/L    RBC Count 3.56 (L) 3.8 - 5.2 10e12/L    Hemoglobin 11.0 (L) 11.7 - 15.7 g/dL    Hematocrit 33.9 (L)  35.0 - 47.0 %    MCV 95 78 - 100 fl    MCH 30.9 26.5 - 33.0 pg    MCHC 32.4 31.5 - 36.5 g/dL    RDW 14.5 10.0 - 15.0 %    Platelet Count 366 150 - 450 10e9/L    Diff Method Automated Method     % Neutrophils 68.8 %    % Lymphocytes 17.2 %    % Monocytes 11.1 %    % Eosinophils 2.1 %    % Basophils 0.3 %    % Immature Granulocytes 0.5 %    Nucleated RBCs 0 0 /100    Absolute Neutrophil 5.4 1.6 - 8.3 10e9/L    Absolute Lymphocytes 1.3 0.8 - 5.3 10e9/L    Absolute Monocytes 0.9 0.0 - 1.3 10e9/L    Absolute Eosinophils 0.2 0.0 - 0.7 10e9/L    Absolute Basophils 0.0 0.0 - 0.2 10e9/L    Abs Immature Granulocytes 0.0 0 - 0.4 10e9/L    Absolute Nucleated RBC 0.0    Basic metabolic panel   Result Value Ref Range    Sodium 137 133 - 144 mmol/L    Potassium 3.8 3.4 - 5.3 mmol/L    Chloride 104 94 - 109 mmol/L    Carbon Dioxide 32 20 - 32 mmol/L    Anion Gap 1 (L) 3 - 14 mmol/L    Glucose 97 70 - 99 mg/dL    Urea Nitrogen 10 7 - 30 mg/dL    Creatinine 0.59 0.52 - 1.04 mg/dL    GFR Estimate >90 >60 mL/min/[1.73_m2]    GFR Estimate If Black >90 >60 mL/min/[1.73_m2]    Calcium 9.1 8.5 - 10.1 mg/dL       Treatments provided: morphine    Family Comments: brother    OBS brochure/video discussed/provided to patient/family: N/A              Name of person given brochure if not patient: x              Relationship to patient: x    ED Medications:   Medications   morphine (PF) injection 4 mg (4 mg Intravenous Given 6/14/19 7032)   Saline Flush (has no administration in time range)   iopamidol (ISOVUE-370) solution 90 mL (90 mLs Intravenous Given 6/14/19 1615)       Drips infusing?:  No    For the majority of the shift this patient was [place].   Interventions performed were x.    Severe Sepsis OR Septic Shock Diagnosis Present: No    To be done/followed up on inpatient unit:  x    ED NURSE PHONE NUMBER: x

## 2019-06-15 LAB
CD3 CELLS # BLD: 882 CELLS/UL (ref 603–2990)
CD3 CELLS NFR BLD: 91 % (ref 49–84)
CD3+CD4+ CELLS # BLD: 306 CELLS/UL (ref 441–2156)
CD3+CD4+ CELLS NFR BLD: 31 % (ref 28–63)
CD3+CD4+ CELLS/CD3+CD8+ CLL BLD: 0.53 % (ref 1.4–2.6)
CD3+CD8+ CELLS # BLD: 571 CELLS/UL (ref 125–1312)
CD3+CD8+ CELLS NFR BLD: 59 % (ref 10–40)
IFC SPECIMEN: ABNORMAL
MAGNESIUM SERPL-MCNC: 2.2 MG/DL (ref 1.6–2.3)

## 2019-06-15 PROCEDURE — 36415 COLL VENOUS BLD VENIPUNCTURE: CPT | Performed by: INTERNAL MEDICINE

## 2019-06-15 PROCEDURE — 25000132 ZZH RX MED GY IP 250 OP 250 PS 637: Performed by: HOSPITALIST

## 2019-06-15 PROCEDURE — 25800030 ZZH RX IP 258 OP 636: Performed by: HOSPITALIST

## 2019-06-15 PROCEDURE — 99232 SBSQ HOSP IP/OBS MODERATE 35: CPT | Performed by: INTERNAL MEDICINE

## 2019-06-15 PROCEDURE — 83735 ASSAY OF MAGNESIUM: CPT | Performed by: HOSPITALIST

## 2019-06-15 PROCEDURE — 86481 TB AG RESPONSE T-CELL SUSP: CPT | Performed by: HOSPITALIST

## 2019-06-15 PROCEDURE — 12000000 ZZH R&B MED SURG/OB

## 2019-06-15 PROCEDURE — 99207 ZZC CDG-MDM COMPONENT: MEETS LOW - DOWN CODED: CPT | Performed by: INTERNAL MEDICINE

## 2019-06-15 PROCEDURE — 86360 T CELL ABSOLUTE COUNT/RATIO: CPT | Performed by: INTERNAL MEDICINE

## 2019-06-15 PROCEDURE — 86359 T CELLS TOTAL COUNT: CPT | Performed by: INTERNAL MEDICINE

## 2019-06-15 PROCEDURE — 36415 COLL VENOUS BLD VENIPUNCTURE: CPT | Performed by: HOSPITALIST

## 2019-06-15 RX ADMIN — SENNOSIDES AND DOCUSATE SODIUM 1 TABLET: 8.6; 5 TABLET ORAL at 09:41

## 2019-06-15 RX ADMIN — SODIUM CHLORIDE: 9 INJECTION, SOLUTION INTRAVENOUS at 16:50

## 2019-06-15 RX ADMIN — SODIUM CHLORIDE: 9 INJECTION, SOLUTION INTRAVENOUS at 06:23

## 2019-06-15 RX ADMIN — DOLUTEGRAVIR SODIUM 50 MG: 50 TABLET, FILM COATED ORAL at 09:40

## 2019-06-15 RX ADMIN — ACETAMINOPHEN 650 MG: 325 TABLET, FILM COATED ORAL at 16:55

## 2019-06-15 RX ADMIN — SENNOSIDES AND DOCUSATE SODIUM 1 TABLET: 8.6; 5 TABLET ORAL at 21:11

## 2019-06-15 RX ADMIN — EMTRICITABINE AND TENOFOVIR ALAFENAMIDE 1 TABLET: 200; 25 TABLET ORAL at 09:41

## 2019-06-15 ASSESSMENT — ACTIVITIES OF DAILY LIVING (ADL)
RETIRED_EATING: 0-->INDEPENDENT
SWALLOWING: 0-->SWALLOWS FOODS/LIQUIDS WITHOUT DIFFICULTY
ADLS_ACUITY_SCORE: 10
ADLS_ACUITY_SCORE: 10
AMBULATION: 0-->INDEPENDENT
DRESS: 0-->INDEPENDENT
COGNITION: 0 - NO COGNITION ISSUES REPORTED
ADLS_ACUITY_SCORE: 10
BATHING: 0-->INDEPENDENT
TOILETING: 0-->INDEPENDENT
ADLS_ACUITY_SCORE: 15
RETIRED_COMMUNICATION: 0-->UNDERSTANDS/COMMUNICATES WITHOUT DIFFICULTY
FALL_HISTORY_WITHIN_LAST_SIX_MONTHS: NO
TRANSFERRING: 0-->INDEPENDENT

## 2019-06-15 NOTE — PHARMACY-ADMISSION MEDICATION HISTORY
Admission medication history interview status for the 6/14/2019  admission is complete. See EPIC admission navigator for prior to admission medications     Medication history source reliability:Good    Actions taken by pharmacist (provider contacted, etc):None     Additional medication history information not noted on PTA med list :None    Medication reconciliation/reorder completed by provider prior to medication history? Yes    Time spent in this activity: 20min, helped by      Prior to Admission medications    Medication Sig Last Dose Taking? Auth Provider   DESCOVY 200-25 MG per tablet Take 1 tablet by mouth daily  Yes Unknown, Entered By History   NONFORMULARY Drug is from [facility].   Contains Rifampin 150mg, INH 75mg, Ethambutol 275mg, pyrazinamide 400mg  Patient is taking total of 4 pills per day.    Has a 1 week supply with her  SIG : 2 tablets bid. took 4 tabs today Yes Unknown, Entered By History   TIVICAY 50 MG tablet Take 50 mg by mouth daily  Yes Unknown, Entered By History

## 2019-06-15 NOTE — CONSULTS
ID consult dictated IMP 1 40 yo female, well known to me controlled HIV  2 New complex issue of L supraclav LN and apparent new upper lobe cavitary lesion, presumptive both TB very complicated as appears to have been on [place] initiated empiric tx and has prior been treated    REc General surgery r ENT to see, needs iopsy with cxs, hold TB meds. Induced sputums ? Bronch, iso

## 2019-06-15 NOTE — PROGRESS NOTES
MD Notification    Notified Person: MD Toro    Notified Person Name: MD Toro    Notification Date/Time: 6/15/19 5267    Notification Interaction: Telephone    Purpose of Notification: Biopsy and Aspiration schedule    Orders Received: Dr. Toro will talk to general surgery regarding biopsy. He is ok with the aspiration taking place on Monday.    Comments:

## 2019-06-15 NOTE — PROVIDER NOTIFICATION
MD Notification    Notified Person: MD    Notified Person Name: Brown    Notification Date/Time: 6/15/2019 1235    Notification Interaction: Page    Purpose of Notification: MD would like neck mass biopsied today    Orders Received: Per surgeon, he will put in for an FNA but does not want to do expose operating room to TB if active     Comments:

## 2019-06-15 NOTE — CONSULTS
Surgery  Request made for open cervical lymph node biopsy.  Patient has a history of HIV with latent tuberculosis.  Was recently in [place] and was thought to have developed clinically active tuberculosis with scrofula.  Was treated empirically and now [place] to the [place].  In looking at her imaging, she has a largely necrotic cluster of lymph nodes in the right supraclavicular fossa.  This directly abuts the subclavian vessels in this area.  Given the fact that this patient has been treated empirically for some time, I do not think the risk of surgical biopsy is warranted.  I have ordered a fine-needle aspiration of this area, as I feel this carries much less risk of exposing hospital staff to active tuberculosis.  This may be somewhat low yield, but I feel it is considerably safer.  This can probably wait until Monday, as the patient has apparently been in this clinical situation for weeks.  Avel Dasilva MD  General Surgery, Office [phone]

## 2019-06-15 NOTE — H&P
[facility]    History and Physical - Hospitalist Service       Date of Admission:  6/14/2019    Assessment & Plan   Krystina Kinney is a 39 year old female who is HIV positive and on antiretroviral therapy.  [place]  She has a history of treatment for pulmonary tuberculosis about 15 or 20 years ago, according to the patient.  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  It is unclear if she was treated for this.  She was diagnosed with HIV in 2017.  She returned to hospitals this April for a visit and while there developed fever, sweats and a painful right supraclavicular mass.  She saw a doctor there who told her it was tuberculosis and gave her pyrazinamide to take.  She presented to the emergency room today with pain and increased swelling in that area.  CT of the neck and chest show necrotic lymph nodes and a necrotic left upper lobe cavitary mass all consistent with tuberculosis.  She is being admitted to the hospital for further management.    1. Right neck/supraclavicular swelling/mass  2. CT scan showing confluent necrotic lymph nodes in the lower right neck and right axilla consistent with scrofula, partially necrotic left upper lobe cavitary mass  3. HIV positive on HAART- CD4 400   4. History of treated pulmonary tuberculosis sometime between 2000 and 2004    Admit to respiratory isolation  Continue Descovy and Tivacay  Sputum for AFB and Quantiferon Gold ordered  Infectious disease consultation in the morning     Diet: Regular   DVT Prophylaxis: Pneumatic Compression Devices  Leone Catheter: not present  Code Status: Full     Disposition Plan   Expected discharge: 3-5 days , recommended to prior living arrangement once adequate pain management/ tolerating PO medications and antibiotic plan established.  Entered: Oliver Barron MD 06/14/2019, 7:05 PM     The patient's care was discussed with  the Patient.    Oliver Barron MD  [facility]    ______________________________________________________________________    Chief Complaint   Neck swelling and pain     History is obtained from the patient, electronic health record and emergency department physician via live interpretor.     History of Present Illness   Krystina Kinney is a 39 year old female who immigrated here from [facility] in 2012.  She says that about 15 or 20 years ago she was treated for pulmonary tuberculosis in [place].  She does not know the medication she was treated with but she remembers that she was treated for 2 months.  In 2011 she had a cervical lymph node biopsy in [facility] for a 3.5 cm right lower cervical lump.  This showed sheets of neutrophils, degeneration, foamy and debris laden macrophages and grossly degenerated epithelioid histocytes.  The diagnosis was chronic separative lymphadenitis suggestive of tuberculosis.  She does not think she was treated for tuberculosis after the biopsy.  When she immigrated here in 2012, she says that she had negative sputum's and chest x-ray for tuberculosis.  She saw a doctor in [place] in 2017 while on a visit home.  She says she just was not feeling very well so she went to get checked out.  She was diagnosed with HIV at that time.  Upon her return to the [facility],  she saw Dr. Toro and has been treated with antiretroviral medications since 2017.  This April she went back home again for a visit.  She says that she was feeling totally well when she left in April but about 6 weeks ago she developed a painful swelling  over her right clavicle.  She was having fever and sweats.  She has not been having any cough or hemoptysis.  She saw Dr. Rodas who told her that she had tuberculosis and she was given pyrazinamide which she was supposed to take for 6 weeks.  Her CD4 count was 400.  She has been taking it and says she has 1 week left.  She says that since then the  swelling has enlarged.  She came to the emergency room today because of the pain.  In the emergency room her blood pressure was 118/77 temperature 98.7  F pulse 107 respiratory rate 16 and oxygen saturation 100%.  He had a 6 cm round mass over her right clavicle.  A CT of the neck showed an irregularly-shaped mass in the lower anterior right neck with irregular peripheral enhancement.  The mass was 4.9 x 5.0 x 5.3 cm.  There were additional lesions consistent with necrotic nodes in the supraclavicular fossa and another cluster of similar nodes high in the right axilla.  CT of the chest showed a cavitary airspace opacity in the left upper lobe.  It also showed cystic lung disease with thin-walled benign-appearing cysts.  There were 6 mm pulmonary nodules seen incidentally.  There is also retroperitoneal adenopathy on the lower cuts which could not be completely evaluated.  The patient was placed in respiratory isolation and is being admitted to the hospital for further management.  The ER physician did speak with Dr. Toro about the patient.    Review of Systems    The 10 point Review of Systems is negative other than noted in the HPI or here.     Past Medical History    I have reviewed this patient's medical history and updated it with pertinent information if needed.   Past Medical History:   Diagnosis Date     Chronic midline low back pain with left-sided sciatica 5/11/2017     Human immunodeficiency virus (HIV) disease (H) 5/11/2017     Pulmonary tuberculosis 2004    diagnosed and treated in [place]- treatment x 2 months- unknown drug(s) and other details       Past Surgical History   I have reviewed this patient's surgical history and updated it with pertinent information if needed.  Past Surgical History:   Procedure Laterality Date     LYMPH NODE BIOPSY Right 2011    Right cervical- Dx:Chronic suppurative lymphadenitis suggestive of tuberculosis- in [place]       Social History   I have reviewed this  patient's social history and updated it with pertinent information if needed.  Social History     Tobacco Use     Smoking status: Never Smoker     Smokeless tobacco: Never Used   Substance Use Topics     Alcohol use: Yes     Drug use: No       Family History   I have reviewed this patient's family history and updated it with pertinent information if needed.   Family History   Problem Relation Age of Onset     Cancer No family hx of        Prior to Admission Medications   Prior to Admission Medications   Prescriptions Last Dose Informant Patient Reported? Taking?   DESCOVY 200-25 MG per tablet   Yes Yes   Sig: Take 1 tablet by mouth daily   TIVICAY 50 MG tablet   Yes Yes   Sig: Take 50 mg by mouth daily   pyrazinamide 500 MG tablet   Yes No   Sig: Take 500 mg by mouth daily      Facility-Administered Medications: None     Allergies   No Known Allergies    Physical Exam   Vital Signs: Temp: 98.7  F (37.1  C) Temp src: Temporal BP: 118/77 Pulse: 107   Resp: 16 SpO2: 100 %      Weight: 0 lbs 0 oz    Constitutional: awake, alert, cooperative, no apparent distress, and appears stated age  Eyes: Lids and lashes normal, pupils equal, round and reactive to light,sclera clear, conjunctiva normal  ENT: Normocephalic, without obvious abnormality, atraumatic, oropharynx covered with a mask  Hematologic / Lymphatic: There is a 6cm round rubbery mass in the right supraclavicular/lower neck area with a central blister.  Mild erythema in the center  no cervical lymphadenopathy, no axillary lymphadenopathy and no inguinal lymphadenopathy palpated  Breasts: no masses palpated bilaterally   Respiratory: No increased work of breathing, good air exchange, clear to auscultation bilaterally, no crackles or wheezing  Cardiovascular:  regular rate and rhythm, normal S1 and S2, no S3 or S4, and no murmur noted  GI:  normal bowel sounds, soft, non-distended, non-tender, no masses palpated, no hepatosplenomegally  Skin: no bruising or bleeding  and no rashes  Musculoskeletal: There is no redness, warmth, or swelling of the joints.  Full range of motion noted.   Tone is normal.  Neurologic: Awake, alert, oriented to name, place and time.  Cranial nerves II-XII are grossly intact.  Motor is 5 out of 5 bilaterally.   Neuropsychiatric: General: normal, calm and normal eye contact    Data   Data reviewed today: I reviewed all medications, new labs and imaging results over the last 24 hours. I personally reviewed no images or EKG's today.    Recent Labs   Lab 06/14/19  1459   WBC 7.8   HGB 11.0*   MCV 95         POTASSIUM 3.8   CHLORIDE 104   CO2 32   BUN 10   CR 0.59   ANIONGAP 1*   RIMA 9.1   GLC 97     Recent Results (from the past 24 hour(s))   Soft tissue neck CT w contrast    Narrative    CT SCAN OF THE NECK WITH CONTRAST  6/14/2019 5:04 PM     HISTORY: Lower right supraclavicular/anterior cervical swelling,  evaluate abscess, lymphadenitis. History of tuberculosis, original  biopsy of nodes from 2011 showed chronic suppurative lymphadenitis  suggestive of tuberculosis. Patient being treated with tears and a  mild.    TECHNIQUE: Axial images and coronal reformations. Radiation dose for  this scan was reduced using automated exposure control, adjustment of  the mA and/or kV according to patient size, or iterative  reconstruction technique. 90mL Isovue-370 IV.    COMPARISON: None.    FINDINGS: There is an irregularly-shaped mass in the lower anterior  right neck with irregular peripheral enhancement around low  attenuation center with no calcification. The mass is 4.9 x 5.0 x 5.3  cm diameter and is in the level V lymph node chain. More posteriorly  and inferiorly, there are additional lesions consistent with necrotic  nodes in the supraclavicular fossa, and another cluster similar nodes  are seen high in the right axilla. There is no adenopathy in the left  neck.    The orbits, sinuses, parotid glands, submandibular glands and thyroid  gland  appear normal. The tongue and airway appear normal. No bone  lesions are seen.    Scans through the upper lungs show a partially cavitary mass or  infiltrate in the left upper lobe centrally measuring 4.5 x 2.6 x 2.6  cm diameter. In addition, there are multiple air cysts bilaterally  throughout both lungs. Most of these are thin-walled, but a few have  slightly thickened walls.      Impression    IMPRESSION:  1. Confluent necrotic lymph nodes in the lower right neck and right  axilla consistent with scrofula.  2. Partially necrotic left upper lobe mass. Differential diagnosis  includes tuberculosis and lung cancer.  3. Multiple air cysts in the lungs. Differential diagnosis is broad  and includes emphysema, alpha-1 antitrypsin deficiency and  lymphangiomyomatosis.    I called the report to Dr. Cecil Mullins in the emergency room at 5:40  PM on 6/14/2019.

## 2019-06-15 NOTE — PROGRESS NOTES
RECEIVING UNIT ED HANDOFF REVIEW    ED Nurse Handoff Report was reviewed by: Patricia Germain on June 14, 2019 at 7:26 PM

## 2019-06-15 NOTE — PLAN OF CARE
DATE & TIME: 6/15/2019 7931-7084  Cognitive Concerns/ Orientation : A&O x4, calm and cooperative   BEHAVIOR & AGGRESSION TOOL COLOR: Green  ABNL VS/O2: BP soft 103/69, otherwise VSS on RA  MOBILITY: Independent   PAIN MANAGMENT: Denies pain  DIET: Regular  BOWEL/BLADDER: Continent   ABNL LAB/BG: .0, Hgb 11.0  DRAIN/DEVICES: IVF running at 100mL/hr  SKIN: R neck swelling and mass above clavicle. Skin intact  TESTS/PROCEDURES: Need AFBs (1 completed)  D/C DAY/GOALS/PLACE: Discharge 3-5 days once adequate pain management/tolerating PO meds and abx plan established per MD note  OTHER IMPORTANT INFO: LS diminished, BS active x4. Airborne precautions maintained. Speaks little English,  schedule for this AM. Speaks [place] ([place]). ID following.

## 2019-06-15 NOTE — PROGRESS NOTES
MD Notification    Notified Person: MD Shah    Notified Person Name: MD Shah    Notification Date/Time: 6/15/19 3760    Notification Interaction: Telephone     Purpose of Notification: US needing clarification on Biopsy    Orders Received: Page ID to get their input.    Comments:

## 2019-06-15 NOTE — PROGRESS NOTES
[facility]    Hospitalist Progress Note    Date of Service (when I saw the patient): 06/15/2019    Assessment & Plan   Krystina Kinney is a pleasant 39 year old woman who is HIV positive and on antiretroviral therapy.  She immigrated here from [facility] in 2012, and has a history of treatment for pulmonary tuberculosis about 15 or 20 years ago.  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  It is unclear if she was treated for this.  She was diagnosed with HIV in 2017.  She returned to [facility] this April for a visit and while there developed fever, sweats and a painful right supraclavicular mass.  She saw a doctor there who told her it was tuberculosis and she was started on (either 4 medications for TB or only pyrazinamide) and has been taking these daily for the past month+.  She presented to the emergency room 6/14 with pain and increased swelling in that area.  CT of the neck and chest show necrotic lymph nodes and a necrotic left upper lobe cavitary mass all consistent with tuberculosis.  She was admitted to the hospital for further management.  Current problems include:     Right neck/supraclavicular swelling/mass; appreciate consult by ID; have requested consult by Gen. Surgery for possible biopsy of this area.  This will likely be done on Monday, 6/17.  - cover this area with light, protective dressing until then (in case it ruptures over the weekend)    CT scan showing confluent necrotic lymph nodes in the lower right neck and right axilla consistent with scrofula  - started on new medication(s) for TB in early May; details pending.  - patient and RNs trying to get more information from hospital in [place]    Partially necrotic left upper lobe cavitary mass; stable.  - as above; further f/u by ID    HIV positive; on HAART- CD4 400   - continue PTA Descovy and Tivacay    History of treated pulmonary tuberculosis - sometime between 2000  and 2004; stable.  - continue respiratory isolation  - sputum for AFB and Quantiferon Gold ordered  - await further input from ID    Anemia; likely due to chronic disease.  - recheck in a.m.       Diet: Regular   DVT Prophylaxis: Pneumatic Compression Devices  Leone Catheter: not present  Code Status: Full     Disposition: Expected discharge in 2-4 days once pending biopsy of Right supraclavicular mass/nodes, and further evaluation by ID and General Surgery.      SILKE Shah MD, [facility]     Internal Medicine Hospitalist  Text Page (7am - 6pm)    Interval History   No new issues overnight; Right upper chest/neck swelling unchanged, tight and somewhat uncomfortable. Krystina had this biopsied in early May at [facility] in [facility] - I reviewed her cytopath. Report with her RN (Culture results not available or were not given to patient yet).  No f/c/SOB; no abdom. Sx.  Appetite good; confined to her room, so is not getting much exercise.  No leg or arm Sx.    -Data reviewed today: I reviewed all new labs and imaging results over the last 24 hours. I personally reviewed no images or EKG's today.    Physical Exam   Temp: 98.5  F (36.9  C) Temp src: Oral BP: 104/74 Pulse: 104   Resp: 18 SpO2: 98 % O2 Device: None (Room air)    Vitals:    06/14/19 1949   Weight: 68.2 kg (150 lb 5.6 oz)     Vital Signs with Ranges  Temp:  [98.5  F (36.9  C)-99  F (37.2  C)] 98.5  F (36.9  C)  Pulse:  [] 104  Resp:  [16-20] 18  BP: ()/(72-77) 104/74  SpO2:  [95 %-100 %] 98 %  No intake/output data recorded.    Constitutional: no acute distress; lying in bed.   HEENT: sclerae clear; EOMI.  MM's moist  Neck: large, mounded area above clavicle on right, with punctate center; this area is erythematous.  No obvious drainage noted.  Respiratory: Good air entry bilaterally, no wheezing or rhonchi  Cardiovascular: S1, S2 present, regular rate and rhythm, without murmur, rubs or gallops  GI: abd. Flat; positive bowel sounds,  soft, non-tender, non-distended  Skin/Integumen: no rash; changes to Right supraclavicular area above  Neurologic: awake, follows directions well; no focal deficits     Medications     sodium chloride 100 mL/hr at 06/15/19 0623       sodium chloride 0.9%  1,000 mL Intravenous Once     dolutegravir  50 mg Oral Daily     emtricitabine-tenofovir AF  1 tablet Oral Daily     senna-docusate  1 tablet Oral BID    Or     senna-docusate  2 tablet Oral BID     sodium chloride (PF)  3 mL Intracatheter Q8H       Data   Recent Labs   Lab 06/14/19  1459   WBC 7.8   HGB 11.0*   MCV 95         POTASSIUM 3.8   CHLORIDE 104   CO2 32   BUN 10   CR 0.59   ANIONGAP 1*   RIMA 9.1   GLC 97       Recent Results (from the past 24 hour(s))   Soft tissue neck CT w contrast    Narrative    CT SCAN OF THE NECK WITH CONTRAST  6/14/2019 5:04 PM     HISTORY: Lower right supraclavicular/anterior cervical swelling,  evaluate abscess, lymphadenitis. History of tuberculosis, original  biopsy of nodes from 2011 showed chronic suppurative lymphadenitis  suggestive of tuberculosis. Patient being treated with Pyrazinamide.    TECHNIQUE: Axial images and coronal reformations. Radiation dose for  this scan was reduced using automated exposure control, adjustment of  the mA and/or kV according to patient size, or iterative  reconstruction technique. 90mL Isovue-370 IV.    COMPARISON: None.    FINDINGS: There is an irregularly-shaped mass in the lower anterior  right neck with irregular peripheral enhancement around low  attenuation center with no calcification. The mass is 4.9 x 5.0 x 5.3  cm diameter and is in the level V lymph node chain. More posteriorly  and inferiorly, there are additional lesions consistent with necrotic  nodes in the supraclavicular fossa, and another cluster similar nodes  are seen high in the right axilla. There is no adenopathy in the left  neck.    The orbits, sinuses, parotid glands, submandibular glands and  thyroid  gland appear normal. The tongue and airway appear normal. No bone  lesions are seen.    Scans through the upper lungs show a partially cavitary mass or  infiltrate in the left upper lobe centrally measuring 4.5 x 2.6 x 2.6  cm diameter. In addition, there are multiple air cysts bilaterally  throughout both lungs. Most of these are thin-walled, but a few have  slightly thickened walls.      Impression    IMPRESSION:  1. Confluent necrotic lymph nodes in the lower right neck and right  axilla consistent with scrofula.  2. Partially necrotic left upper lobe mass. Differential diagnosis  includes tuberculosis and lung cancer.  3. Multiple air cysts in the lungs. Differential diagnosis is broad  and includes emphysema, alpha-1 antitrypsin deficiency and  lymphangiomyomatosis.    I called the report to Dr. Cecil Mullins in the emergency room at 5:40  PM on 6/14/2019.    DONNA BELLE MD   Chest CT w/o contrast    Narrative    CT CHEST WITHOUT CONTRAST   6/14/2019 6:50 PM     HISTORY: Further evaluation of lung lesions    TECHNIQUE: No IV contrast material. Radiation dose for this scan was  reduced using automated exposure control, adjustment of the mA and/or  kV according to patient size, or iterative reconstruction technique.    COMPARISON: CT neck 6/14/2019    FINDINGS: Noncontrast images of the chest less well demonstrate right  supraclavicular adenopathy. There are multiple benign-appearing,  thin-walled cystic lesions scattered throughout the lungs. There is  airspace opacity in the anterolateral left upper lobe with some  cavitation.    There is a left upper lobe nodule that measures 6 mm (series 4, image  131). There is another nodule in the right lower lobe that measures 6  mm (series 4, image 152).    No pleural or pericardial effusion. No mediastinal or hilar  adenopathy. There are mildly enlarged left periaortic lymph nodes, one  of which at the inferior margin of the study measures 2.4 x 1.4  cm  (series 2, image 60).      Impression    IMPRESSION:  1. Cavitary airspace opacity in the left upper lobe is likely  infectious and active tuberculosis is a consideration. This is  associated with necrotic supraclavicular adenopathy on the right may  represent scrofula.  2. Cystic lung disease with thin-walled, benign-appearing cysts. This  may be related Langerhans cell histiocytosis or  lymphangioleiomyomatosis. Emphysema considered less likely.  3. Incidental 6 mm pulmonary nodules. Consider follow-up CT in 6-12  months.  4. Probable retroperitoneal adenopathy is incompletely evaluated on  this study.    INCK ARRIAZA MD

## 2019-06-15 NOTE — CONSULTS
Consult Date:  06/15/2019      INFECTIOUS DISEASE CONSULTATIO      REQUESTING PHYSICIAN:  Oliver Barron MD.      IMPRESSION:   1.  This is a 39-year-old [place] immigrant female, well known to me in the clinic where I followed her for chronic stable HIV infection for which she is on ongoing treatment and well-controlled disease.   2.  While in [place] recently for 2 months, about a month ago, developed right neck swelling and inflammation, was seen there and diagnosed with scrofula without any diagnostic testing.  She was started on 4-drug antituberculous empiric treatment.  This is in fact likely the diagnosis complex lymphadenopathy structure in that area.   3.  Prior history of pulmonary tuberculosis treated in [place] for which we do not know the details or whether full treatment was completed, but clinically improved.   4.  Current episode further complicated by obvious lung involvement not known to have chronic scarring in this area in the lung, but has not had previous CT scan, this is presumptive pulmonary tuberculosis greatly complicating current situation.      RECOMMENDATIONS:   1.  Very complicated and difficult situation.  Very unfortunately has been started on empiric antituberculous in [place], despite the fact she had prior treatment for which by at least are standards would mandate getting diagnostic testing and get the actual organism to know the correct treatment.  She has been on Merrem months, a 4-drug antituberculous therapy so isolating organisms may be complicated here.  Nonetheless, an effort for diagnostic intervention here is required attempt to get three induced sputums for AFB.   2.  Probably should have operative intervention diagnostically on the lymphadenopathy, have ENT or General Surgery see for this purpose.  If she only had the lymph node problem, we might even consider holding TB drugs for a couple of weeks and then doing the test, but given the lung involvement more  urgent situation is present.   3.  Isolation for now, although given she has been on 4-drug, TB therapy is not likely contagious even if the pulmonary process is tuberculosis.   4. Possibly we are going to land on a situation were unable to get a diagnosis microbiologically in which case, in all likelihood we would proceed with a full 6-month course of 4-drug therapy, assuming possible underlying resistance.  This is quite undesirable on many levels, but for now proceed to try to make a diagnosis.  This is a very difficult situation.      HISTORY:  This is a 39-year-old female seen in consultation due to possible tuberculosis.  The patient is actually well known to me.  I have seen her in the clinic for TB.  She has been somewhat noncompliant and goes back and forth between here in [place], but for the most part has been on treatment and had controlled disease over the last couple of years with T cells in the 300-400 range.  She remains on treatment now and continued this during a recent trip to [place].  In the background of this, she had a known history to us already of tuberculosis for which she was treated in [place] with an unknown regimen with an unknown duration, but by all signs appeared to have been resolved.  A chest x-ray here has been negative and she never had any signs of active infection or tuberculosis.  She then went back to [place] again about 2 months ago, continued to take her HIV meds and was feeling well.  She did not really develop clinical illness and had specifically no respiratory symptoms, but then began developing right neck and supraclavicular lymphadenopathy.  She was seen in [place] and is commonly the strategy there. They simply gave her 4-drug TB treatment without any diagnostic testing.  She has been on that regimen for the last month and of interest has had only minimal change or improvement.  On returning here, she sought medical attention.  Imaging here showed not only the  lymphadenopathy, but now actually a cavitary lesion in her upper lobe, very suspicious for active tuberculosis.      PAST MEDICAL HISTORY:  Prior active tuberculosis treated in [place] 19 years ago.  History of HIV infection, well controlled on long-term treatment.  No other complications or infections.      SOCIAL AND FAMILY HISTORY:  [place] immigrant unknown TB already.  No active alcohol or tobacco use, just back in [place].  Again, no other infection problems there, besides the lymph node swelling.      ALLERGIES:  None.      REVIEW OF SYSTEMS:  Not having major fevers, although vaguely not feeling particularly well.  No  available and somewhat limited English.      PHYSICAL EXAMINATION:   GENERAL:  The patient appears her stated age, does not look toxic or ill.   VITAL SIGNS:  Currently normal, including being afebrile.   HEENT:  No thrush or intraoral lesions.  Pupils reactive.   NECK:  Right-sided lymphadenopathy extending into the supraclavicular area.  There is almost a pointed area of swelling.   LUNGS:  Clear bilaterally.   HEART:  Without major murmur or gallop.   EXTREMITIES:  No other rashes, skin lesions.      LABORATORY DATA:  No micro to date. Imaging with cavitary lesion in the right upper lobe.      Thank you very much for consultation.  I will follow the patient with you.         ZARI LUNA MD             D: 06/15/2019   T: 06/15/2019   MT: NICKOLAS      Name:     NIKKI ALVA   MRN:      [id number]        Account:       [id number]   :      1980           Consult Date:  06/15/2019      Document: [id number]       cc: Oliver Vinson MD

## 2019-06-15 NOTE — PLAN OF CARE
DATE & TIME: 06/14/19 8972-0102   Cognitive Concerns/ Orientation : A&Ox4, Amhavic speaking (Moroccan)  BEHAVIOR & AGGRESSION TOOL COLOR: Green  CIWA SCORE: NA  ABNL VS/O2: VSS on RA  MOBILITY: Independent  PAIN MANAGMENT: Denies  DIET: Reg  BOWEL/BLADDER: Continent  ABNL LAB/BG: Hgb 11.0  DRAIN/DEVICES: IVF infusing  TELEMETRY RHYTHM: NA  SKIN: Intact  TESTS/PROCEDURES: CT scan showed necrotic lymph nodes  D/C DAY/GOALS/PLACE: Pending  OTHER IMPORTANT INFO:  scheduled for 8am tomorrow. ID following. Airborne ISO

## 2019-06-15 NOTE — PLAN OF CARE
DATE & TIME: 6/15/2019 3130-1743  Cognitive Concerns/ Orientation : A&O x4, calm and cooperative   BEHAVIOR & AGGRESSION TOOL COLOR: Green  ABNL VS/O2: BP soft 104/74, tachy at times on RA  MOBILITY: Independent   PAIN MANAGMENT: Denies pain  DIET: Regular  BOWEL/BLADDER: Continent   ABNL LAB/BG: .0, Hgb 11.0  DRAIN/DEVICES: IVF running at 100mL/hr  SKIN: R neck swelling and mass above clavicle. Skin intact  TESTS/PROCEDURES: Need AFBs (1 completed), general surgery consulted, FNA ordered  D/C DAY/GOALS/PLACE: Discharge 3-5 days once adequate pain management/tolerating PO meds and abx plan established per MD note  OTHER IMPORTANT INFO: LS diminished, BS active x4. Airborne precautions maintained. Speaks little English,  scheduled for tomorrow. Speaks Lithuanian (Guatemalan). ID following.

## 2019-06-16 ENCOUNTER — APPOINTMENT (OUTPATIENT)
Dept: GENERAL RADIOLOGY | Facility: CLINIC | Age: 39
DRG: 802 | End: 2019-06-16
Attending: INTERNAL MEDICINE
Payer: COMMERCIAL

## 2019-06-16 LAB — HGB BLD-MCNC: 10.9 G/DL (ref 11.7–15.7)

## 2019-06-16 PROCEDURE — 87015 SPECIMEN INFECT AGNT CONCNTJ: CPT | Performed by: HOSPITALIST

## 2019-06-16 PROCEDURE — 87077 CULTURE AEROBIC IDENTIFY: CPT | Performed by: INTERNAL MEDICINE

## 2019-06-16 PROCEDURE — 12000000 ZZH R&B MED SURG/OB

## 2019-06-16 PROCEDURE — 36415 COLL VENOUS BLD VENIPUNCTURE: CPT | Performed by: INTERNAL MEDICINE

## 2019-06-16 PROCEDURE — 85018 HEMOGLOBIN: CPT | Performed by: INTERNAL MEDICINE

## 2019-06-16 PROCEDURE — 87206 SMEAR FLUORESCENT/ACID STAI: CPT | Performed by: HOSPITALIST

## 2019-06-16 PROCEDURE — 87116 MYCOBACTERIA CULTURE: CPT | Performed by: HOSPITALIST

## 2019-06-16 PROCEDURE — 71046 X-RAY EXAM CHEST 2 VIEWS: CPT

## 2019-06-16 PROCEDURE — 87536 HIV-1 QUANT&REVRSE TRNSCRPJ: CPT | Performed by: INTERNAL MEDICINE

## 2019-06-16 PROCEDURE — 25000132 ZZH RX MED GY IP 250 OP 250 PS 637: Performed by: HOSPITALIST

## 2019-06-16 PROCEDURE — 99233 SBSQ HOSP IP/OBS HIGH 50: CPT | Performed by: INTERNAL MEDICINE

## 2019-06-16 PROCEDURE — 25800030 ZZH RX IP 258 OP 636: Performed by: HOSPITALIST

## 2019-06-16 PROCEDURE — 87070 CULTURE OTHR SPECIMN AEROBIC: CPT | Performed by: INTERNAL MEDICINE

## 2019-06-16 RX ADMIN — SODIUM CHLORIDE: 9 INJECTION, SOLUTION INTRAVENOUS at 02:35

## 2019-06-16 RX ADMIN — DOLUTEGRAVIR SODIUM 50 MG: 50 TABLET, FILM COATED ORAL at 08:32

## 2019-06-16 RX ADMIN — EMTRICITABINE AND TENOFOVIR ALAFENAMIDE 1 TABLET: 200; 25 TABLET ORAL at 08:32

## 2019-06-16 RX ADMIN — SENNOSIDES AND DOCUSATE SODIUM 1 TABLET: 8.6; 5 TABLET ORAL at 20:04

## 2019-06-16 RX ADMIN — SENNOSIDES AND DOCUSATE SODIUM 1 TABLET: 8.6; 5 TABLET ORAL at 08:32

## 2019-06-16 ASSESSMENT — ACTIVITIES OF DAILY LIVING (ADL)
ADLS_ACUITY_SCORE: 10

## 2019-06-16 NOTE — PROGRESS NOTES
[facility]    Hospitalist Progress Note    Assessment & Plan   Krystina Kinney is a pleasant 39 year old woman who is HIV positive and on antiretroviral therapy.  She immigrated here from [place] in 2012, and has a history of treatment for pulmonary tuberculosis about 15 or 20 years ago.  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  It is unclear if she was treated for this.  She was diagnosed with HIV in 2017.  She returned to [facility] this April for a visit and while there developed fever, sweats and a painful right supraclavicular mass.  She saw a doctor there who told her it was tuberculosis and she was started on (either 4 medications for TB or only pyrazinamide) and has been taking these daily for the past month+.  She presented to the emergency room 6/14 with pain and increased swelling in that area.  CT of the neck and chest show necrotic lymph nodes and a necrotic left upper lobe cavitary mass all consistent with tuberculosis.  She was admitted to the hospital for further management.  Current problems include:      CT scan showing confluent necrotic lymph nodes in the lower right neck and right axilla consistent with scrofula  Right neck/supraclavicular swelling/mass;  -consulted ID  -Complicated situation as pt initiated on emperic multidrug Rx in [place] which complicates evaluation and potential tx plan. She has been on , a 4-drug antituberculous regimen, so isolating the organisms may be difficult   -Seen by consult by Gen. Surgery for possible biopsy of this area. Gen surgery recommending FNA for now.  This will  be done on Monday, 6/17.  - cover this area with light, protective dressing until then (in case it ruptures over the weekend).   -Given drainage today will culture this fluid for Gram stain and culture.  Discussed with ID.  discussed with RN.  -isolation for now  -afb cx and stain pending  -quantiferon TB gold plus  pending  -gram stain and culture of R draining abscess  -ID following, case discussed  -may ultimately need to treat for full 6 month course of 4 drug therapy if unable to obtain diagnoses microbiologically.   -cXR today to determine if see infultrate on xray        Partially necrotic left upper lobe cavitary mass; stable.  - as above; further f/u by ID  -CXR 6/16: shows Left upper lobe masslike infiltrate is unchanged. Right  lower neck and axillary soft tissue swelling is unchanged. No pleural  effusion, no other infiltrates.  -ID following, ruling out active TB per ID   -isolation for now     HIV positive; on HAART- CD4 400   Followed by dr. Toro outpatient  - continue PTA Descovy and T[place]ay     History of treated pulmonary tuberculosis - sometime between 2000 and 2004; stable.  - continue respiratory isolation  - sputum for AFB and Quantiferon Gold ordered  -FNA of cervical lad tomorrow per surgery  -cx puss from abscess neck  - ID following     Anemia; likely due to chronic disease.  - recheck today        Diet: Regular   DVT Prophylaxis: Pneumatic Compression Devices  Leone Catheter: not present  Code Status: Full      Disposition: Expected discharge in 2-4 days once pending biopsy of Right supraclavicular mass/nodes, and further evaluation by ID and General Surgery.      discussed care plan with surgery, ID, pt, RN    Caesar Corona MD  Text Page  (7am to 6pm)  Interval History   No cp or sob. No cough  Some mild urinary freq.  Urinating 4 times per day instead of 2-3.   No dysuria, no hematuria  Appetite fine  No n/v/d/abd pain, neck pain    Neck abscess started draining last night.     -Data reviewed today: I reviewed all new labs and imaging results over the last 24 hours. I personally reviewed labs and imaging since admission    Physical Exam   Temp: 98.2  F (36.8  C) Temp src: Oral BP: 107/76 Pulse: 77   Resp: 16 SpO2: 97 % O2 Device: None (Room air)    Vitals:    06/14/19 1949   Weight: 68.2 kg  (150 lb 5.6 oz)     Vital Signs with Ranges  Temp:  [97.9  F (36.6  C)-100.3  F (37.9  C)] 98.2  F (36.8  C)  Pulse:  [] 77  Resp:  [16-18] 16  BP: ()/(71-76) 107/76  SpO2:  [94 %-97 %] 97 %  I/O last 3 completed shifts:  In: 3228 [P.O.:480; I.V.:2748]  Out: -     Constitutional: Nad, nontoxic  Neck: fullness, abscess R neck base with purulent drainage  No LAD other areas of neck exam  Respiratory: CTAB, breathing easily, no coughing  Cardiovascular: RRR no r/g/m. Not tachy  GI: soft, nt, nd  Skin/Integumen: no rash or edema  Neuro: nl speech and mentation  Psych: nl affect.     Medications     sodium chloride 100 mL/hr at 06/16/19 0235       sodium chloride 0.9%  1,000 mL Intravenous Once     dolutegravir  50 mg Oral Daily     emtricitabine-tenofovir AF  1 tablet Oral Daily     senna-docusate  1 tablet Oral BID    Or     senna-docusate  2 tablet Oral BID     sodium chloride (PF)  3 mL Intracatheter Q8H       Data   Recent Labs   Lab 06/14/19  1459   WBC 7.8   HGB 11.0*   MCV 95         POTASSIUM 3.8   CHLORIDE 104   CO2 32   BUN 10   CR 0.59   ANIONGAP 1*   RIMA 9.1   GLC 97       Imaging:   No results found for this or any previous visit (from the past 24 hour(s)).

## 2019-06-16 NOTE — PLAN OF CARE
DATE & TIME: 6/15/2019 6433-8250  Cognitive Concerns/ Orientation : A&Ox4  BEHAVIOR & AGGRESSION TOOL COLOR: Green  ABNL VS/O2: BP soft 104/74, tachy at times on RA Temp 100.3, 98.8 after Tylenol.  MOBILITY: Independent   PAIN MANAGMENT: Tylenol given x1 for fever and headache.   DIET: Regular  BOWEL/BLADDER: Continent   ABNL LAB/BG: .0, Hgb 11.0  DRAIN/DEVICES: IVF running at 100mL/hr  SKIN: R neck swelling and mass above clavicle. Skin intact  TESTS/PROCEDURES: Need AFBs (1 completed), general surgery consulted, FNA ordered. Plan to aspirate mass on Monday per surgery note.   D/C DAY/GOALS/PLACE: Discharge 3-5 days once adequate pain management/tolerating PO meds and abx plan established per MD note  OTHER IMPORTANT INFO: Airborne precautions maintained. Speaks little English,  scheduled for tomorrow. Speaks Slovenian (Mongolian). ID following. Patient reached out to PCP in [place] to possibly get previous culture results.

## 2019-06-16 NOTE — PLAN OF CARE
DATE & TIME: 6/16/2019 0119-2230  Cognitive Concerns/ Orientation : A&O x4, calm and cooperative   BEHAVIOR & AGGRESSION TOOL COLOR: Green  ABNL VS/O2: VSS on RA  MOBILITY: Independent   PAIN MANAGMENT: Denies pain, R mass on neck tender to touch  DIET: Regular   BOWEL/BLADDER: Continent   ABNL LAB/BG: Hgb 10.9  DRAIN/DEVICES: IVF discontinued   SKIN: Mass on R side of neck draining purulent liquid.   TESTS/PROCEDURES: Abscess fluid culture pending, AFBs pending (2 completed), need one more  D/C DAY/GOALS/PLACE: discharge 2-4 days per MD note  OTHER IMPORTANT INFO: LS clear, BS active x4. Speaks Mongolian, from [place]. Airborne precautions maintained. FNA scheduled for tomorrow. CXR completed today. Dressing on neck changed

## 2019-06-16 NOTE — PLAN OF CARE
DATE & TIME: 6/15/19, 1210 - 1999   Cognitive Concerns/ Orientation : A&O x 4   BEHAVIOR & AGGRESSION TOOL COLOR: Green  CIWA SCORE: N/a   ABNL VS/O2: VSS on room air  MOBILITY: Independent  PAIN MANAGMENT: Denied  DIET: Regular  BOWEL/BLADDER: Continent  ABNL LAB/BG: Sputum sample collected for AFB. Results pending  DRAIN/DEVICES: PIV infusing at 100 ml/hr  TELEMETRY RHYTHM: N/a  SKIN: Oozing reddened mass to right neck. Dressing was changed  TESTS/PROCEDURES: N/a  D/C DAY/GOALS/PLACE: Pending  OTHER IMPORTANT INFO: Malay speaking. Understands little English. Airborne precautions maintained. ID following.

## 2019-06-16 NOTE — PROGRESS NOTES
[facility]  Infectious Disease Progress Note          Assessment and Plan:   IMPRESSION:   1.  This is a 39-year-old [place] immigrant female, well known to me in the clinic where I followed her for chronic stable HIV infection for which she is on ongoing treatment and well-controlled disease.   2.  While in [place] recently for 2 months, about a month ago, developed right neck swelling and inflammation, was seen there and diagnosed with scrofula without any diagnostic testing.  She was started on 4-drug antituberculous empiric treatment.  This is in fact likely the diagnosis for this complex lymphadenopathy structure in that area, but no micro confirmation.   3.  Prior history of pulmonary tuberculosis treated in [place] for which we do not know the details or whether full treatment was completed, but clinically improved.   4.  Current episode now further complicated by obvious lung involvement not known to have chronic scarring in this area in the lung, but has not had previous CT scan, this is presumptive pulmonary tuberculosis greatly complicating current situation.      RECOMMENDATIONS:   1.  Very complicated and difficult situation.  Very unfortunately has been started on empiric antituberculous in [place], despite the fact she had prior treatment for which by at least [place] standards would mandate getting diagnostic testing and get the actual organism due to R concerns.  She has been on , a 4-drug antituberculous regimen, so isolating the organisms may be difficult  Nonetheless, an effort for diagnostic intervention here is required attempt to get three induced sputums for AFB.   2.  Probably should have operative intervention diagnostically on the lymphadenopathy, Dr Dasilva note reviewed.  If she only had the lymph node problem, we might even consider holding TB drugs for a couple of weeks and then doing intervention, but given the lung involvement more urgent situation is present.  Needle aspiration yield is low even wo issue of pretreatment  3.  Isolation for now, although given she has been on 4-drug, TB therapy is not likely to still be contagious even if the pulmonary process is tuberculosis.   4. Possibly we are going to land on a situation were unable to get a diagnosis microbiologically in which case, in all likelihood we would proceed with a full 6-month course of 4-drug therapy, assuming possible underlying resistance.  This is quite undesirable on many levels, but for now proceed to try to make a diagnosis.  This is a very difficult situation.   5 CXR to see if we see this on plain film(prior US CXRs neg)                 Interval History:   no new complaints and doing well; no cp, sob, n/v/d, or abd pain. 100.2 temp of some note, not coughing ? Successful induction              Medications:       sodium chloride 0.9%  1,000 mL Intravenous Once     dolutegravir  50 mg Oral Daily     emtricitabine-tenofovir AF  1 tablet Oral Daily     senna-docusate  1 tablet Oral BID    Or     senna-docusate  2 tablet Oral BID     sodium chloride (PF)  3 mL Intracatheter Q8H                  Physical Exam:   Blood pressure 107/76, pulse 77, temperature 97.9  F (36.6  C), temperature source Oral, resp. rate 16, weight 68.2 kg (150 lb 5.6 oz), SpO2 97 %.  Wt Readings from Last 2 Encounters:   06/14/19 68.2 kg (150 lb 5.6 oz)   05/11/17 48.1 kg (106 lb)     Vital Signs with Ranges  Temp:  [97.9  F (36.6  C)-100.3  F (37.9  C)] 97.9  F (36.6  C)  Pulse:  [] 77  Resp:  [16-18] 16  BP: ()/(71-76) 107/76  SpO2:  [94 %-97 %] 97 %    Constitutional: Awake, alert, cooperative, no apparent distress   Lungs: Clear to auscultation bilaterally, no crackles or wheezing   Cardiovascular: Regular rate and rhythm, normal S1 and S2, and no murmur noted   Abdomen: Normal bowel sounds, soft, non-distended, non-tender   Skin: No rashes, no cyanosis, no edema   Other:           Data:   All microbiology  laboratory data reviewed.  Recent Labs   Lab Test 06/14/19  1459   WBC 7.8   HGB 11.0*   HCT 33.9*   MCV 95        Recent Labs   Lab Test 06/14/19  1459   CR 0.59     No lab results found.  Recent Labs   Lab Test 05/11/17  1039   CULT 50,000 to 100,000 colonies/mL mixed urogenital luis Susceptibility testing not   routinely done

## 2019-06-16 NOTE — PLAN OF CARE
DATE & TIME: 6/16/2019 5912-6503  Cognitive Concerns/ Orientation : A&O x4, calm and cooperative   BEHAVIOR & AGGRESSION TOOL COLOR: Green  ABNL VS/O2: VSS on RA  MOBILITY: Independent   PAIN MANAGMENT: Denies pain, R mass on neck tender to touch  DIET: Regular   BOWEL/BLADDER: Continent   ABNL LAB/BG: Hgb 10.9  DRAIN/DEVICES: IVF discontinued   SKIN: Mass on R side of neck draining purulent liquid.   TESTS/PROCEDURES: Abscess fluid culture pending, AFBs pending (2 completed), need one more  D/C DAY/GOALS/PLACE: discharge 2-4 days per MD note  OTHER IMPORTANT INFO: LS clear, BS active x4. Speaks Tajik, from [place]. Airborne precautions maintained. FNA scheduled for tomorrow. CXR completed today.

## 2019-06-17 LAB
GAMMA INTERFERON BACKGROUND BLD IA-ACNC: 0.34 IU/ML
GRAM STN SPEC: NORMAL
Lab: NORMAL
M TB IFN-G BLD-IMP: POSITIVE
M TB IFN-G CD4+ BCKGRND COR BLD-ACNC: 2.71 IU/ML
MITOGEN IGNF BCKGRD COR BLD-ACNC: 3.87 IU/ML
MITOGEN IGNF BCKGRD COR BLD-ACNC: 4.08 IU/ML
SPECIMEN SOURCE: NORMAL

## 2019-06-17 PROCEDURE — 99232 SBSQ HOSP IP/OBS MODERATE 35: CPT | Performed by: INTERNAL MEDICINE

## 2019-06-17 PROCEDURE — 25000132 ZZH RX MED GY IP 250 OP 250 PS 637: Performed by: HOSPITALIST

## 2019-06-17 PROCEDURE — 87205 SMEAR GRAM STAIN: CPT | Performed by: INTERNAL MEDICINE

## 2019-06-17 PROCEDURE — 87070 CULTURE OTHR SPECIMN AEROBIC: CPT | Performed by: INTERNAL MEDICINE

## 2019-06-17 PROCEDURE — 87015 SPECIMEN INFECT AGNT CONCNTJ: CPT | Performed by: HOSPITALIST

## 2019-06-17 PROCEDURE — 12000000 ZZH R&B MED SURG/OB

## 2019-06-17 PROCEDURE — 87206 SMEAR FLUORESCENT/ACID STAI: CPT | Performed by: HOSPITALIST

## 2019-06-17 PROCEDURE — 87116 MYCOBACTERIA CULTURE: CPT | Performed by: HOSPITALIST

## 2019-06-17 RX ADMIN — EMTRICITABINE AND TENOFOVIR ALAFENAMIDE 1 TABLET: 200; 25 TABLET ORAL at 10:02

## 2019-06-17 RX ADMIN — SENNOSIDES AND DOCUSATE SODIUM 1 TABLET: 8.6; 5 TABLET ORAL at 10:01

## 2019-06-17 RX ADMIN — DOLUTEGRAVIR SODIUM 50 MG: 50 TABLET, FILM COATED ORAL at 10:01

## 2019-06-17 ASSESSMENT — ACTIVITIES OF DAILY LIVING (ADL)
ADLS_ACUITY_SCORE: 10

## 2019-06-17 NOTE — PROGRESS NOTES
[facility]    Hospitalist Progress Note    Assessment & Plan   Krystina Kinney is a pleasant 39 year old woman who is HIV positive and on antiretroviral therapy.  She immigrated here from [place] in 2012, and has a history of treatment for pulmonary tuberculosis about 15 or 20 years ago.  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  It is unclear if she was treated for this.  She was diagnosed with HIV in 2017.  She returned to hospitals this April for a visit and while there developed fever, sweats and a painful right supraclavicular mass.  She saw a doctor there who told her it was tuberculosis and she was started on (either 4 medications for TB or only pyrazinamide) and has been taking these daily for the past month+.  She presented to the emergency room 6/14 with pain and increased swelling in that area.  CT of the neck and chest show necrotic lymph nodes and a necrotic left upper lobe cavitary mass all consistent with tuberculosis.  She was admitted to the hospital for further management.  Current problems include:      CT scan showing confluent necrotic lymph nodes in the lower right neck and right axilla consistent with scrofula  Right neck/supraclavicular swelling/mass;  -consulted ID  -Complicated situation as pt initiated on emperic multidrug Rx in [place] which complicates evaluation and potential tx plan. She has been on , a 4-drug antituberculous regimen, so isolating the organisms may be difficult   -Seen by consult by Gen. Surgery for possible biopsy of this area. Gen surgery recommending FNA for now.  This will  be done on Monday, 6/17.  - cover this area with light, protective dressing until then (in case it ruptures over the weekend).   -Given drainage today will culture this fluid for Gram stain and culture.  Discussed with ID.  discussed with RN.  -isolation for now  -afb cx and stain pending  -quantiferon TB gold plus  pending  -gram stain and culture of R draining abscess  -ID following, case discussed  -may ultimately need to treat for full 6 month course of 4 drug therapy if unable to obtain diagnoses microbiologically.   -cXR 6/17 shows: Left upper lobe masslike infiltrate is unchanged. Right lower neck and axillary soft tissue swelling is unchanged. No pleural effusion, no other infiltrates. This is new and very likely c/w TB  -FNA of R neck LAD today           Partially necrotic left upper lobe cavitary mass; stable.  - as above; further f/u by ID  -CXR 6/16: shows Left upper lobe masslike infiltrate is unchanged. Right  lower neck and axillary soft tissue swelling is unchanged. No pleural  effusion, no other infiltrates.  -ID following, ruling out active TB per ID   -isolation for now  -FNA of R neck LAD today     HIV positive; on HAART- CD4 400   Followed by dr. Toro outpatient  - continue PTA Descovy and Tivacay     History of treated pulmonary tuberculosis - sometime between 2000 and 2004; stable.  - continue respiratory isolation  - sputum for AFB and Quantiferon Gold ordered  -FNA of neck LAD per surgery  -cx puss from abscess neck  - ID following     Anemia; likely due to chronic disease.  - recheck today        Diet: Regular   DVT Prophylaxis: Pneumatic Compression Devices  Leone Catheter: not present  Code Status: Full      Disposition: Expected discharge in 2-4 days once pending biopsy of Right supraclavicular mass/nodes, and further evaluation by ID and General Surgery.      discussed care plan with, ID, pt, RN    Caesar Corona MD  Text Page  (7am to 6pm)  Interval History   No cp or sob. No cough  FNA today  States feeling better. No new issues    -Data reviewed today: I reviewed all new labs and imaging results over the last 24 hours. I personally reviewed labs and imaging since admission    Physical Exam   Temp: 97.8  F (36.6  C) Temp src: Oral BP: 108/77 Pulse: 86   Resp: 16 SpO2: 91 % O2 Device: None  (Room air)    Vitals:    06/14/19 1949   Weight: 68.2 kg (150 lb 5.6 oz)     Vital Signs with Ranges  Temp:  [97.8  F (36.6  C)-98.5  F (36.9  C)] 97.8  F (36.6  C)  Pulse:  [76-92] 86  Resp:  [16] 16  BP: (108-114)/(77-86) 108/77  SpO2:  [91 %-95 %] 91 %  I/O last 3 completed shifts:  In: 921 [P.O.:480; I.V.:441]  Out: -     Constitutional: Nad, nontoxic. Laying in bed,   Neck: fullness, abscess R neck base with purulent drainage, not draining today. Area indurated and tender/swollen  No LAD other areas of neck exam  Respiratory: CTAB, breathing easily, no coughing  Cardiovascular: RRR no r/g/m. Not tachy  GI: soft, nt, nd  Skin/Integumen: no rash or edema  Neuro: nl speech and mentation  Psych: nl affect.     Medications       sodium chloride 0.9%  1,000 mL Intravenous Once     dolutegravir  50 mg Oral Daily     emtricitabine-tenofovir AF  1 tablet Oral Daily     senna-docusate  1 tablet Oral BID    Or     senna-docusate  2 tablet Oral BID     sodium chloride (PF)  3 mL Intracatheter Q8H       Data   Recent Labs   Lab 06/16/19  0947 06/14/19  1459   WBC  --  7.8   HGB 10.9* 11.0*   MCV  --  95   PLT  --  366   NA  --  137   POTASSIUM  --  3.8   CHLORIDE  --  104   CO2  --  32   BUN  --  10   CR  --  0.59   ANIONGAP  --  1*   RIMA  --  9.1   GLC  --  97       Imaging:   No results found for this or any previous visit (from the past 24 hour(s)).

## 2019-06-17 NOTE — PLAN OF CARE
DATE & TIME: 6/17   Cognitive Concerns/ Orientation : A&O x4, calm and cooperative   BEHAVIOR & AGGRESSION TOOL COLOR: Green  ABNL VS/O2: VSS on RA  MOBILITY: Independent   PAIN MANAGMENT: Denies pain, R mass on neck tender to touch  DIET: Regular   BOWEL/BLADDER: Continent   ABNL LAB/BG: Hgb 10.9  DRAIN/DEVICES: IVF discontinued   SKIN: Mass on R side of neck draining purulent liquid.   TESTS/PROCEDURES: Abscess fluid culture pending, AFBs pending (2 completed), need one more  D/C DAY/GOALS/PLACE: discharge 2-4 days per MD note  OTHER IMPORTANT INFO:

## 2019-06-17 NOTE — PROGRESS NOTES
ID   Discussed case with [facility] TB, same plan agrred with  Add TB PCR to 1 sputum and to FNA and [facility] TB meds for now

## 2019-06-17 NOTE — PROGRESS NOTES
[facility]  Infectious Disease Progress Note          Assessment and Plan:   IMPRESSION:   1.  This is a 39-year-old [place] immigrant female, well known to me in the clinic where I followed her for chronic stable HIV infection for which she is on ongoing treatment and well-controlled disease.   2.  While in [place] recently for 2 months, about a month ago, developed right neck swelling and inflammation, was seen there and diagnosed with scrofula without any diagnostic testing.  She was started on 4-drug antituberculous empiric treatment.  This is in fact likely the diagnosis for this complex lymphadenopathy structure in that area, but no micro confirmation.   3.  Prior history of pulmonary tuberculosis treated in [place] for which we do not know the details or whether full treatment was completed, but clinically improved.   4.  Current episode now further complicated by obvious lung involvement not known to have chronic scarring in this area in the lung, but has not had previous CT scan, this is presumptive pulmonary tuberculosis greatly complicating current situation.      RECOMMENDATIONS:   1.  Very complicated and difficult situation.  Very unfortunately has been started on empiric antituberculous in [place], despite the fact she had prior treatment for which by at least [place] standards would mandate getting diagnostic testing and get the actual organism due to R concerns.  She has been on , a 4-drug antituberculous regimen, so isolating the organisms may be difficult  Nonetheless, an effort for diagnostic intervention here is required attempt to get three induced sputums for AFB.   2.  Probably should have operative intervention diagnostically on the lymphadenopathy, Dr Dasilva note reviewed.  If she only had the lymph node problem, we might even consider holding TB drugs for a couple of weeks and then doing intervention, but given the lung involvement more urgent situation is present.  Needle aspiration yield is low even wo issue of pretreatment  3.  Isolation for now, although given she has been on 4-drug, TB therapy is not likely to still be contagious even if the pulmonary process is tuberculosis.   4. Possibly we are going to land on a situation were unable to get a diagnosis microbiologically in which case, in all likelihood we would proceed with a full 6-month course of 4-drug therapy, assuming possible underlying resistance.  This is quite undesirable on many levels, but for now proceed to try to make a diagnosis.  This is a very difficult situation.   5 CXR to see if we see this on plain film(prior US CXRs neg), is very obvious and thus proven this is new, and likely is TB  6 Get needle attempt, bacterial cx wd, this type of drainage not common but occurs with scrofula at times(not contagious)                 Interval History:   no new complaints and doing well; no cp, sob, n/v/d, or abd pain. 100.2 temp of some note, not coughing ? Successful induction as some samples sent  CXR obvious new area seen on plain film compared to normal prior              Medications:       sodium chloride 0.9%  1,000 mL Intravenous Once     dolutegravir  50 mg Oral Daily     emtricitabine-tenofovir AF  1 tablet Oral Daily     senna-docusate  1 tablet Oral BID    Or     senna-docusate  2 tablet Oral BID     sodium chloride (PF)  3 mL Intracatheter Q8H                  Physical Exam:   Blood pressure 114/86, pulse 76, temperature 98.2  F (36.8  C), temperature source Oral, resp. rate 16, weight 68.2 kg (150 lb 5.6 oz), SpO2 95 %.  Wt Readings from Last 2 Encounters:   06/14/19 68.2 kg (150 lb 5.6 oz)   05/11/17 48.1 kg (106 lb)     Vital Signs with Ranges  Temp:  [98.2  F (36.8  C)-98.5  F (36.9  C)] 98.2  F (36.8  C)  Pulse:  [76-92] 76  Resp:  [16] 16  BP: (114)/(86) 114/86  SpO2:  [95 %] 95 %    Constitutional: Awake, alert, cooperative, no apparent distress   Lungs: Clear to auscultation bilaterally, no  crackles or wheezing   Cardiovascular: Regular rate and rhythm, normal S1 and S2, and no murmur noted   Abdomen: Normal bowel sounds, soft, non-distended, non-tender   Skin: No rashes, no cyanosis, no edema   Other:           Data:   All microbiology laboratory data reviewed.  Recent Labs   Lab Test 06/16/19  0947 06/14/19  1459   WBC  --  7.8   HGB 10.9* 11.0*   HCT  --  33.9*   MCV  --  95   PLT  --  366     Recent Labs   Lab Test 06/14/19  1459   CR 0.59     No lab results found.  Recent Labs   Lab Test 06/16/19  1032 05/11/17  1039   CULT PENDING 50,000 to 100,000 colonies/mL mixed urogenital luis Susceptibility testing not   routinely done

## 2019-06-17 NOTE — PLAN OF CARE
DATE & TIME: 6/18 -700-1930   Cognitive Concerns/ Orientation : A&O x4, calm and cooperative   BEHAVIOR & AGGRESSION TOOL COLOR: Green  ABNL VS/O2: VSS on RA  MOBILITY: Independent   PAIN MANAGMENT: Denies pain, R mass on neck tender to touch  DIET: Regular   BOWEL/BLADDER: Continent   ABNL LAB/BG: Hgb 10.9  DRAIN/DEVICES: IVF discontinued   SKIN: Mass on R side of neck draining purulent liquid.   TESTS/PROCEDURES: Abscess fluid culture pending, AFBs pending , plan for US biopsy  of right neck mass.  D/C DAY/GOALS/PLACE: pending  cultures  OTHER IMPORTANT INFO: speaks little english, ID following

## 2019-06-18 ENCOUNTER — APPOINTMENT (OUTPATIENT)
Dept: ULTRASOUND IMAGING | Facility: CLINIC | Age: 39
DRG: 802 | End: 2019-06-18
Attending: HOSPITALIST
Payer: COMMERCIAL

## 2019-06-18 LAB
BACTERIA SPEC CULT: ABNORMAL
BACTERIA SPEC CULT: ABNORMAL
HIV1 RNA # PLAS NAA DL=20: NORMAL {COPIES}/ML
HIV1 RNA SERPL NAA+PROBE-LOG#: NORMAL {LOG_COPIES}/ML
Lab: ABNORMAL
SPECIMEN SOURCE: ABNORMAL

## 2019-06-18 PROCEDURE — 87116 MYCOBACTERIA CULTURE: CPT | Performed by: INTERNAL MEDICINE

## 2019-06-18 PROCEDURE — 88305 TISSUE EXAM BY PATHOLOGIST: CPT | Performed by: HOSPITALIST

## 2019-06-18 PROCEDURE — 87149 DNA/RNA DIRECT PROBE: CPT | Performed by: HOSPITALIST

## 2019-06-18 PROCEDURE — 87206 SMEAR FLUORESCENT/ACID STAI: CPT | Performed by: INTERNAL MEDICINE

## 2019-06-18 PROCEDURE — 87150 DNA/RNA AMPLIFIED PROBE: CPT | Performed by: HOSPITALIST

## 2019-06-18 PROCEDURE — 88305 TISSUE EXAM BY PATHOLOGIST: CPT | Mod: 26 | Performed by: HOSPITALIST

## 2019-06-18 PROCEDURE — 12000000 ZZH R&B MED SURG/OB

## 2019-06-18 PROCEDURE — 88312 SPECIAL STAINS GROUP 1: CPT | Performed by: HOSPITALIST

## 2019-06-18 PROCEDURE — 87158 CULTURE TYPING ADDED METHOD: CPT | Performed by: INTERNAL MEDICINE

## 2019-06-18 PROCEDURE — 88312 SPECIAL STAINS GROUP 1: CPT | Mod: 26,59 | Performed by: HOSPITALIST

## 2019-06-18 PROCEDURE — 25000125 ZZHC RX 250: Performed by: RADIOLOGY

## 2019-06-18 PROCEDURE — 87015 SPECIMEN INFECT AGNT CONCNTJ: CPT | Performed by: HOSPITALIST

## 2019-06-18 PROCEDURE — 87206 SMEAR FLUORESCENT/ACID STAI: CPT | Performed by: HOSPITALIST

## 2019-06-18 PROCEDURE — 87556 M.TUBERCULO DNA AMP PROBE: CPT | Performed by: HOSPITALIST

## 2019-06-18 PROCEDURE — 99233 SBSQ HOSP IP/OBS HIGH 50: CPT | Performed by: INTERNAL MEDICINE

## 2019-06-18 PROCEDURE — 07B13ZX EXCISION OF RIGHT NECK LYMPHATIC, PERCUTANEOUS APPROACH, DIAGNOSTIC: ICD-10-PCS | Performed by: RADIOLOGY

## 2019-06-18 PROCEDURE — 87186 SC STD MICRODIL/AGAR DIL: CPT | Performed by: INTERNAL MEDICINE

## 2019-06-18 PROCEDURE — 87149 DNA/RNA DIRECT PROBE: CPT | Performed by: INTERNAL MEDICINE

## 2019-06-18 PROCEDURE — 87116 MYCOBACTERIA CULTURE: CPT | Performed by: HOSPITALIST

## 2019-06-18 PROCEDURE — 76942 ECHO GUIDE FOR BIOPSY: CPT

## 2019-06-18 PROCEDURE — 87186 SC STD MICRODIL/AGAR DIL: CPT | Performed by: HOSPITALIST

## 2019-06-18 PROCEDURE — 25000132 ZZH RX MED GY IP 250 OP 250 PS 637: Performed by: HOSPITALIST

## 2019-06-18 PROCEDURE — 87798 DETECT AGENT NOS DNA AMP: CPT | Performed by: HOSPITALIST

## 2019-06-18 RX ORDER — LIDOCAINE HYDROCHLORIDE 10 MG/ML
10 INJECTION, SOLUTION EPIDURAL; INFILTRATION; INTRACAUDAL; PERINEURAL ONCE
Status: COMPLETED | OUTPATIENT
Start: 2019-06-18 | End: 2019-06-18

## 2019-06-18 RX ORDER — DEXTROSE MONOHYDRATE 25 G/50ML
25-50 INJECTION, SOLUTION INTRAVENOUS
Status: CANCELLED | OUTPATIENT
Start: 2019-06-18

## 2019-06-18 RX ORDER — NICOTINE POLACRILEX 4 MG
15-30 LOZENGE BUCCAL
Status: CANCELLED | OUTPATIENT
Start: 2019-06-18

## 2019-06-18 RX ADMIN — DOLUTEGRAVIR SODIUM 50 MG: 50 TABLET, FILM COATED ORAL at 08:28

## 2019-06-18 RX ADMIN — LIDOCAINE HYDROCHLORIDE 10 ML: 10 INJECTION, SOLUTION EPIDURAL; INFILTRATION; INTRACAUDAL; PERINEURAL at 09:57

## 2019-06-18 RX ADMIN — EMTRICITABINE AND TENOFOVIR ALAFENAMIDE 1 TABLET: 200; 25 TABLET ORAL at 08:28

## 2019-06-18 ASSESSMENT — ACTIVITIES OF DAILY LIVING (ADL)
ADLS_ACUITY_SCORE: 10

## 2019-06-18 NOTE — PROCEDURES
RADIOLOGY POST PROCEDURE NOTE    Patient name: Krystina Kinney  MRN: [id number]  : 1980    Pre-procedure diagnosis: Tuberculosis   Post-procedure diagnosis: Same    Procedure Date/Time: 2019  10:14 AM  Procedure:   US guided aspiration and biopsy right neck lymph node  Estimated blood loss: None  Specimen(s) collected with description:   Right neck lymph node    The patient tolerated the procedure well with no immediate complications.  Significant findings:none    See imaging dictation for procedural details.    Provider name: Samuel Cabello  Assistant(s):None

## 2019-06-18 NOTE — PROGRESS NOTES
[facility]    Hospitalist Progress Note    Assessment & Plan   Krystina Kinney is a pleasant 39 year old woman who is HIV positive and on antiretroviral therapy.  She immigrated here from [place] in 2012, and has a history of treatment for pulmonary tuberculosis about 15 or 20 years ago.  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  It is unclear if she was treated for this.  She was diagnosed with HIV in 2017.  She returned to [facility] this April for a visit and while there developed fever, sweats and a painful right supraclavicular mass.  She saw a doctor there who told her it was tuberculosis and she was started on (either 4 medications for TB or only pyrazinamide) and has been taking these daily for the past month+.  She presented to the emergency room 6/14 with pain and increased swelling in that area.  CT of the neck and chest show necrotic lymph nodes and a necrotic left upper lobe cavitary mass all consistent with tuberculosis.  She was admitted to the hospital for further management.  Current problems include:      CT scan showing confluent necrotic lymph nodes in the lower right neck and right axilla consistent with scrofula  Right neck/supraclavicular swelling/mass;  -consulted ID  -Complicated situation as pt initiated on emperic multidrug Rx in [place] which complicates evaluation and potential tx plan. She has been on , a 4-drug antituberculous regimen, so isolating the organisms may be difficult   -Seen by consult by Gen. Surgery for possible biopsy of this area. Gen surgery recommending FNA for now.  This will  be done on Monday, 6/17.  - cover this area with light, protective dressing until then (in case it ruptures over the weekend).   -Given drainage today will culture this fluid for Gram stain and culture.  Discussed with ID.  discussed with RN.  -isolation for now  -afb cx and stain pending  -quantiferon TB gold plus  pending  -gram stain and culture of R draining abscess  -ID following, case discussed  -may ultimately need to treat for full 6 month course of 4 drug therapy if unable to obtain diagnoses microbiologically.   -cXR 6/17 shows: Left upper lobe masslike infiltrate is unchanged. Right lower neck and axillary soft tissue swelling is unchanged. No pleural effusion, no other infiltrates. This is new and very likely c/w TB  -FNA of R neck LAD today. Path pending  - clinically has recurrent TB  -per ID not need further source control (I and D or LN excision to aid in treatment)  - await pcr testing of LN FNA, sputum  Discussed with ID today          Partially necrotic left upper lobe cavitary mass; stable.  - as above; further f/u by ID  -CXR 6/16: shows Left upper lobe masslike infiltrate is unchanged. Right  lower neck and axillary soft tissue swelling is unchanged. No pleural  effusion, no other infiltrates.  -ID following, ruling out active TB per ID   -isolation for now  -FNA of R neck LAD today  -- diagnostic testing pending of LN and sputum     HIV positive; on HAART- CD4 400   Followed by dr. Toro outpatient  - continue PTA Descovy and Tivacay     History of treated pulmonary tuberculosis - sometime between 2000 and 2004; stable.  - continue respiratory isolation  - sputum for AFB and Quantiferon Gold ordered  -FNA of neck LAD per surgery  -cx puss from abscess neck  - ID following  -clinically has recurrence. Awaiting PcR results on specimens     Anemia; likely due to chronic disease.  Hb 10-11 range. No bleeding     Diet: Regular   DVT Prophylaxis: Pneumatic Compression Devices  Leone Catheter: not present  Code Status: Full      Disposition: Expected discharge possibly in 1-2 days. Need results of TB diagnostic test results first. Per ID      discussed care plan with, ID, pt, RN    Caesar Corona MD  Text Page  (7am to 6pm)  Interval History   No cp or sob. No cough  FNA this am  States feeling better. No  new issues  No uti sxs.   No n/v/d/abd pain    -Data reviewed today: I reviewed all new labs and imaging results over the last 24 hours. I personally reviewed labs and imaging since admission    Physical Exam   Temp: 98.2  F (36.8  C) Temp src: Oral BP: 96/73 Pulse: 85   Resp: 16 SpO2: 94 % O2 Device: None (Room air)    Vitals:    06/14/19 1949   Weight: 68.2 kg (150 lb 5.6 oz)     Vital Signs with Ranges  Temp:  [98.2  F (36.8  C)-98.3  F (36.8  C)] 98.2  F (36.8  C)  Pulse:  [85] 85  Resp:  [16] 16  BP: (95-96)/(73-74) 96/73  SpO2:  [94 %-96 %] 94 %  No intake/output data recorded.    Constitutional: Nad, nontoxic. Laying in bed,   Neck: fullness, abscess R neck base with no purulent drainage today, not draining today. Area indurated and tender/swollen  No LAD other areas of neck exam  Respiratory: CTAB, breathing easily, no coughing  Cardiovascular: RRR no r/g/m. Not tachy  GI: soft, nt, nd  Skin/Integumen: no rash or edema  Neuro: nl speech and mentation  Psych: nl affect.     Medications       sodium chloride 0.9%  1,000 mL Intravenous Once     dolutegravir  50 mg Oral Daily     emtricitabine-tenofovir AF  1 tablet Oral Daily     senna-docusate  1 tablet Oral BID    Or     senna-docusate  2 tablet Oral BID     sodium chloride (PF)  3 mL Intracatheter Q8H       Data   Recent Labs   Lab 06/16/19  0947 06/14/19  1459   WBC  --  7.8   HGB 10.9* 11.0*   MCV  --  95   PLT  --  366   NA  --  137   POTASSIUM  --  3.8   CHLORIDE  --  104   CO2  --  32   BUN  --  10   CR  --  0.59   ANIONGAP  --  1*   RIMA  --  9.1   GLC  --  97       Imaging:   Recent Results (from the past 24 hour(s))   US Biopsy Lymph Node Core    Narrative    Procedure(s):  Ultrasound-guided lymph node biopsy    Date of Procedure: 6/18/2019    Operators:    Samuel Cabello MD    Medications:  1% Lidocaine SQ    Contrast: None    Referenced air kerma: 0 mGy  Fluoroscopy time: 0 minutes    Estimated blood loss: Minimal    Complications:  None    Pre-procedure diagnosis: Tuberculosis  Post-procedure diagnosis: Same    Clinical History/Indication: 39-year-old female with active  tuberculosis in large necrotic lymph node in the right supraclavicular  region.    Procedure and Findings:  Following a discussion of the risks, benefits, indications, and  alternatives to treatment, appropriate informed consent was obtained  from the patient.  The patient was seen at bedside and placed supine  on the table. The right supraclavicular region was prepped and draped  in the usual sterile fashion.  A timeout was performed per hospital  universal protocol policy to confirm the correct patient, site and  procedure to be performed.    Preliminary ultrasound of the right neck was performed to assess for  appropriate sites for access.  These images identify large necrotic  lymph node and an ultrasound image was archived. An appropriate site  for skin access was identified and the overlying soft tissues were  infiltrated with 1% Lidocaine for local anesthesia. Under direct  ultrasound visualization an 17-gauge trocar needle was advanced into  the target lesion. Approximately 20 mL of purulent fluid was aspirated  through the trocar. Then, through the trocar needle, an 18-gauge Bard  spring loaded biopsy was used to obtain 4 core biopsy specimen. The  biopsy samples were visually inspected and when they were found to be  adequate they were submitted to pathology. All needles were removed  and hemostasis was achieved with manual compression. A sterile  dressing was applied.     Throughout the procedure, the patient was monitored by a radiology  nurse for cardiac rhythm, blood pressure and oxygen saturation which  remained stable. The patient tolerated the procedure well and left  interventional radiology in stable condition.      Impression    Impression:  Ultrasound-guided targeted lymph node biopsy, as described    CHRIS RODRIGUEZ MD

## 2019-06-18 NOTE — PLAN OF CARE
DATE & TIME:  6/18/19 0700- 1930                Cognitive Concerns/ Orientation : A/Ox4   BEHAVIOR & AGGRESSION TOOL COLOR: GREEN  CIWA SCORE:             ABNL VS/O2: VSS. RA  MOBILITY: independent  PAIN MANAGMENT: denied  DIET: regular   BOWEL/BLADDER: up to bathroom   ABNL LAB/BG:   DRAIN/DEVICES:   TELEMETRY RHYTHM:   SKIN: Right neck dressing c/d/i   TESTS/PROCEDURES: had US Biopsy of lymph node   D/C DAY/GOALS/PLACE: pending cultures   OTHER IMPORTANT INFO: speaks little english, ID following

## 2019-06-18 NOTE — PROGRESS NOTES
DATE & TIME: 1900-0730 6/17/19    Cognitive Concerns/ Orientation : A/Ox4   BEHAVIOR & AGGRESSION TOOL COLOR: GREEN  CIWA SCORE:    ABNL VS/O2: VSS. RA  MOBILITY: IND  PAIN MANAGMENT: small headache   DIET: regular   BOWEL/BLADDER: up to bathroom   ABNL LAB/BG:   DRAIN/DEVICES:   TELEMETRY RHYTHM:   SKIN: gauze on rt neck for any drainage   TESTS/PROCEDURES:   D/C DAY/GOALS/PLACE: DIscharge in 2+ days   OTHER IMPORTANT INFO:

## 2019-06-18 NOTE — PROGRESS NOTES
[facility]  Infectious Disease Progress Note          Assessment and Plan:   IMPRESSION:   1.  This is a 39-year-old [place] immigrant female, well known to me in the clinic where I followed her for chronic stable HIV infection for which she is on ongoing treatment and well-controlled disease.   2.  While in [place] recently for 2 months, about a month ago, developed right neck swelling and inflammation, was seen there and diagnosed with scrofula without any diagnostic testing.  She was started on 4-drug antituberculous empiric treatment.  This is in fact likely the diagnosis for this complex lymphadenopathy structure in that area, but no micro confirmation.   3.  Prior history of pulmonary tuberculosis treated in [place] for which we do not know the details or whether full treatment was completed, but clinically improved.   4.  Current episode now further complicated by obvious lung involvement not known to have chronic scarring in this area in the lung, but has not had previous CT scan, this is presumptive pulmonary tuberculosis greatly complicating current situation.      RECOMMENDATIONS:   1.  Very complicated and difficult situation.  Very unfortunately has been started on empiric antituberculous in [place], despite the fact she had prior treatment for which by at least US standards would mandate getting diagnostic testing and get the actual organism due to R concerns.  She has been on , a 4-drug antituberculous regimen, so isolating the organisms may be difficult  Nonetheless, an effort for diagnostic intervention here is required attempt to get three induced sputums for AFB.   2.  ? operative intervention diagnostically on the lymphadenopathy, Dr Dasilva note reviewed.  If she only had the lymph node problem, we might even consider holding TB drugs for a couple of weeks and then doing intervention, but given the lung involvement more urgent situation is present. Needle aspiration yield  is low even wo issue of pretreatment but path should be diagnostic at least to prove TB is the dx, and PCR some yield  3.  Isolation for now, although given she has been on 4-drug, TB therapy is not likely to still be contagious even if the pulmonary process is tuberculosis.   4. Possibly we are going to land on a situation were unable to get a diagnosis microbiologically in which case, in all likelihood we would proceed with a full 6-month course of 4-drug therapy, assuming possible underlying resistance.  This is quite undesirable on many levels, but for now proceed to try to make a diagnosis.  This is a very difficult situation.   5 CXR to see if we see this on plain film(prior US CXRs neg), is very obvious and thus proven this is new, and likely is TB  6 Get needle attempt, bacterial cx wd neg, this type of drainage not common but occurs with scrofula at times(not contagious)  On needle AFB smear/cx and TB PCR, await sputum smears and also get TB PCR on 1 of sputums direct. Likely stil to be + even with tx  7 Continue HAART, undet virus,                  Interval History:   no new complaints and doing well; no cp, sob, n/v/d, or abd pain. 1 100.2 temp of some note, not coughing ? Successful induction as some samples sent all pending  CXR obvious new area seen on plain film compared to normal prior              Medications:       sodium chloride 0.9%  1,000 mL Intravenous Once     dolutegravir  50 mg Oral Daily     emtricitabine-tenofovir AF  1 tablet Oral Daily     senna-docusate  1 tablet Oral BID    Or     senna-docusate  2 tablet Oral BID     sodium chloride (PF)  3 mL Intracatheter Q8H                  Physical Exam:   Blood pressure 96/73, pulse 85, temperature 98.2  F (36.8  C), temperature source Oral, resp. rate 16, weight 68.2 kg (150 lb 5.6 oz), SpO2 94 %.  Wt Readings from Last 2 Encounters:   06/14/19 68.2 kg (150 lb 5.6 oz)   05/11/17 48.1 kg (106 lb)     Vital Signs with Ranges  Temp:  [97.8  F  (36.6  C)-98.6  F (37  C)] 98.2  F (36.8  C)  Pulse:  [85-89] 85  Resp:  [16] 16  BP: ()/(73-77) 96/73  SpO2:  [91 %-98 %] 94 %    Constitutional: Awake, alert, cooperative, no apparent distress   Lungs: Clear to auscultation bilaterally, no crackles or wheezing   Cardiovascular: Regular rate and rhythm, normal S1 and S2, and no murmur noted   Abdomen: Normal bowel sounds, soft, non-distended, non-tender   Skin: No rashes, no cyanosis, no edema   Other:           Data:   All microbiology laboratory data reviewed.  Recent Labs   Lab Test 06/16/19  0947 06/14/19  1459   WBC  --  7.8   HGB 10.9* 11.0*   HCT  --  33.9*   MCV  --  95   PLT  --  366     Recent Labs   Lab Test 06/14/19  1459   CR 0.59     No lab results found.  Recent Labs   Lab Test 06/16/19  1032 05/11/17  1039   CULT Culture in progress 50,000 to 100,000 colonies/mL mixed urogenital luis Susceptibility testing not   routinely done

## 2019-06-19 LAB
ACID FAST STN SPEC QL: NORMAL
BACTERIA SPEC CULT: NORMAL
SPECIMEN SOURCE: NORMAL

## 2019-06-19 PROCEDURE — 25000132 ZZH RX MED GY IP 250 OP 250 PS 637: Performed by: HOSPITALIST

## 2019-06-19 PROCEDURE — 99232 SBSQ HOSP IP/OBS MODERATE 35: CPT | Performed by: INTERNAL MEDICINE

## 2019-06-19 PROCEDURE — 12000000 ZZH R&B MED SURG/OB

## 2019-06-19 RX ADMIN — DOLUTEGRAVIR SODIUM 50 MG: 50 TABLET, FILM COATED ORAL at 08:58

## 2019-06-19 RX ADMIN — SENNOSIDES AND DOCUSATE SODIUM 1 TABLET: 8.6; 5 TABLET ORAL at 19:32

## 2019-06-19 RX ADMIN — EMTRICITABINE AND TENOFOVIR ALAFENAMIDE 1 TABLET: 200; 25 TABLET ORAL at 08:58

## 2019-06-19 ASSESSMENT — ACTIVITIES OF DAILY LIVING (ADL)
ADLS_ACUITY_SCORE: 10

## 2019-06-19 NOTE — PROGRESS NOTES
[facility]    Hospitalist Progress Note    Assessment & Plan   Krystina Kinney is a pleasant 39 year old woman who is HIV positive and on antiretroviral therapy.  She immigrated here from [place] in 2012, and has a history of treatment for pulmonary tuberculosis about 15 or 20 years ago.  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  It is unclear if she was treated for this.  She was diagnosed with HIV in 2017.  She returned to [facility] this April for a visit and while there developed fever, sweats and a painful right supraclavicular mass.  She saw a doctor there who told her it was tuberculosis and she was started on (either 4 medications for TB or only pyrazinamide) and has been taking these daily for the past month+.  She presented to the emergency room 6/14 with pain and increased swelling in that area.  CT of the neck and chest show necrotic lymph nodes and a necrotic left upper lobe cavitary mass all consistent with tuberculosis.  She was admitted to the hospital for further management.  Current problems include:      CT scan showing confluent necrotic lymph nodes in the lower right neck and right axilla consistent with scrofula  Right neck/supraclavicular swelling/mass;  -consulted ID  -Complicated situation as pt initiated on emperic multidrug Rx in [place] which complicates evaluation and potential tx plan. She has been on , a 4-drug antituberculous regimen, so isolating the organisms may be difficult   -Seen by consult by Gen. Surgery for possible biopsy of this area. Gen surgery recommending FNA for now.  This will  be done on Monday, 6/17.  - cover this area with light, protective dressing until then (in case it ruptures over the weekend).   -Given drainage today will culture this fluid for Gram stain and culture.  Discussed with ID.  discussed with RN.  -isolation for now  - sputum for AFB negative for acid-fast bacteria x2.   Gram stain negative.  -quantiferon TB gold plus   -gram stain and culture of R draining abscess shows coag negative staph light growth.  -ID following, case discussed again today.  -may ultimately need to treat for full 6 month course of 4 drug therapy if unable to obtain diagnoses microbiologically.   -cXR 6/17 shows: Left upper lobe masslike infiltrate is unchanged. Right lower neck and axillary soft tissue swelling is unchanged. No pleural effusion, no other infiltrates. 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          Partially necrotic left upper lobe cavitary mass; stable.  - as above; further f/u by ID  -CXR 6/16: shows Left upper lobe masslike infiltrate is unchanged. Right  lower neck and axillary soft tissue swelling is unchanged. No pleural  effusion, no other infiltrates.  -ID following, ruling out active TB per ID   -isolation for now  -FNA of R neck LAD today  -- diagnostic testing pending of LN and sputum     HIV positive; on HAART- CD4 400   Followed by dr. Toro outpatient  - continue PTA [place]cov[place] and [place]     History of treated pulmonary tuberculosis - sometime between 2000 and 2004; stable.  - continue respiratory isolation  - sputum for AFB negative for acid-fast bacteria.  Gram stain negative.  -QuantiFERON gold test is positive.  -FNA of neck LAD per surgery  -cx puss from abscess neck  - ID following  -clinically has recurrence. Awaiting PcR results on specimens     Anemia; likely due to chronic disease.  Hb 10-11 range. No bleeding     Diet: Regular   DVT Prophylaxis: Pneumatic Compression Devices  Leone Catheter: not present  Code Status: Full      Disposition: Expected discharge possibly in 1-2 days. Need results  of TB diagnostic test results first. Per ID      discussed care plan with, ID, pt, RN    Caesar Corona MD  Text Page  (7am to 6pm)  Interval History   Feeling fine.  No cough or shortness of breath.  No fever.  No nausea vomiting or diarrhea.  No new concerns.    -Data reviewed today: I reviewed all new labs and imaging results over the last 24 hours. I personally reviewed labs and imaging since admission    Physical Exam   Temp: 97.3  F (36.3  C) Temp src: Oral BP: 110/79 Pulse: 87   Resp: 18 SpO2: 95 % O2 Device: None (Room air)    Vitals:    06/14/19 1949   Weight: 68.2 kg (150 lb 5.6 oz)     Vital Signs with Ranges  Temp:  [97.3  F (36.3  C)-97.7  F (36.5  C)] 97.3  F (36.3  C)  Pulse:  [87-99] 87  Resp:  [16-18] 18  BP: (101-110)/(74-79) 110/79  SpO2:  [95 %] 95 %  I/O last 3 completed shifts:  In: 240 [P.O.:240]  Out: -     Constitutional: Nad, Sitting up in chair.  Nontoxic-appearing  Neck: fullness, abscess R neck base.  Area is indurated and slightly tender.  Not draining.  Swelling his been unchanged.  No LAD other areas of neck exam  Respiratory: CTAB, breathing easily, no coughing  Cardiovascular: RRR no r/g/m. Not tachy  GI: soft, nt, nd  Skin/Integumen: no rash or edema  Neuro: nl speech and mentation  Psych: nl affect.     Medications       sodium chloride 0.9%  1,000 mL Intravenous Once     dolutegravir  50 mg Oral Daily     emtricitabine-tenofovir AF  1 tablet Oral Daily     senna-docusate  1 tablet Oral BID    Or     senna-docusate  2 tablet Oral BID     sodium chloride (PF)  3 mL Intracatheter Q8H       Data   Recent Labs   Lab 06/16/19  0947 06/14/19  1459   WBC  --  7.8   HGB 10.9* 11.0*   MCV  --  95   PLT  --  366   NA  --  137   POTASSIUM  --  3.8   CHLORIDE  --  104   CO2  --  32   BUN  --  10   CR  --  0.59   ANIONGAP  --  1*   RIMA  --  9.1   GLC  --  97       Imaging:   No results found for this or any previous visit (from the past 24 hour(s)).

## 2019-06-19 NOTE — PROGRESS NOTES
"SPIRITUAL HEALTH SERVICES Progress Note  [facility]    Attempted visit per LOS. Upon arrival,  realized that pt is in full isolation. I do not have the proper training to operate the suit.  also did not see an . As pt's chart notes say the pt \"speaks little English\" and there was no interpretor, and as the chart notes indicate the pt is susceptible to illness,  did not enter the room for the sake of the pt's overall health and wellness.     remains available upon request.    Annie Mcdonald   Intern  Pager:  [phone]  "

## 2019-06-19 NOTE — PROGRESS NOTES
DATE & TIME: 1900-0730 6/18/19    Cognitive Concerns/ Orientation : A/Ox4   BEHAVIOR & AGGRESSION TOOL COLOR: GREEN  CIWA SCORE:    ABNL VS/O2: VSS. RA  MOBILITY: IND  PAIN MANAGMENT: denies at this time   DIET: regular   BOWEL/BLADDER: up to bathroom   ABNL LAB/BG: awaiting microbio lab results   DRAIN/DEVICES: saline locked   TELEMETRY RHYTHM:   SKIN: US biopsy incision on rt side of neg; enlarged lymph node; bandaid CDI  TESTS/PROCEDURES:   D/C DAY/GOALS/PLACE: DIscharge in 1-2 days pend TB results   OTHER IMPORTANT INFO:   Addendum: pt incision site draining; light red/watery thin drainage; all over her pillow and back of gown; cleaned the site, dressed it. Fluid is slowly trickling out now but if pressure is applied to the top, drainage runs out of incision site.

## 2019-06-19 NOTE — PROGRESS NOTES
[facility]  Infectious Disease Progress Note          Assessment and Plan:   IMPRESSION:   1.  This is a 39-year-old [place] immigrant female, well known to me in the clinic where I followed her for chronic stable HIV infection for which she is on ongoing treatment and well-controlled disease.   2.  While in [place] recently for 2 months, about a month ago, developed right neck swelling and inflammation, was seen there and diagnosed with scrofula without any diagnostic testing.  She was started on 4-drug antituberculous empiric treatment.  This is in fact likely the diagnosis for this complex lymphadenopathy structure in that area, but no micro confirmation.   3.  Prior history of pulmonary tuberculosis treated in [place] for which we do not know the details or whether full treatment was completed, but clinically improved.   4.  Current episode now further complicated by obvious lung involvement not known to have chronic scarring in this area in the lung, but has not had previous CT scan, this is presumptive pulmonary tuberculosis greatly complicating current situation.      RECOMMENDATIONS:   1.  Very complicated and difficult situation.  Very unfortunately has been started on empiric antituberculous in [place], despite the fact she had prior treatment for which by at least [place] standards would mandate getting diagnostic testing and get the actual organism due to R concerns.  She has been on , a 4-drug antituberculous regimen for 1 month, so isolating the organisms may be difficult  Nonetheless, an effort for diagnostic intervention here is required ,attempt to get three induced sputums for AFB.   2.  ? operative intervention diagnostically on the lymphadenopathy, Dr Dasilva note reviewed.  If she only had the lymph node problem, we might even consider holding TB drugs for a couple of weeks and then doing intervention, but given the lung involvement more urgent situation is present. Needle  aspiration yield is low even wo issue of pretreatment but path should be diagnostic at least to prove TB is the dx, and PCR significant yield  3.  Isolation for now, although given she has been on 4-drug TB therapy and and not coughing,  is not likely to still be contagious even if the pulmonary process is tuberculosis.   4. Possibly we are going to land on a situation were unable to get a diagnosis microbiologically in which case, in all likelihood we would proceed with a full 6-month course of 4-drug therapy, assuming possible underlying resistance.  This is quite undesirable on many levels, but for now proceed to try to make a diagnosis.  This is a very difficult situation.   5 CXR to see if we see this on plain film(prior US CXRs neg),abnormality is very obvious and thus proven this is new, and likely is TB  6 Get needle attempt, bacterial cx wd neg except skin luis (CN staph), this type of drainage not common but occurs with scrofula at times(not contagious)  On needle AFB smear/cx and TB PCR, await sputum smears, 1 smear back neg and was adequate specimen, pending TB PCR on 1 of sputums direct. Conceivably still to be + even with tx  7 Continue HAART, undet virus, T cells 300(stable)  8 Once prelim info back will discuss again home plan                 Interval History:   no new complaints and doing well; no cp, sob, n/v/d, or abd pain. 1 100.2 temp of some note, not coughing ? Successful induction as some samples sent all pending  CXR obvious new area seen on plain film compared to normal prior              Medications:       sodium chloride 0.9%  1,000 mL Intravenous Once     dolutegravir  50 mg Oral Daily     emtricitabine-tenofovir AF  1 tablet Oral Daily     senna-docusate  1 tablet Oral BID    Or     senna-docusate  2 tablet Oral BID     sodium chloride (PF)  3 mL Intracatheter Q8H                  Physical Exam:   Blood pressure 101/74, pulse 93, temperature 97.7  F (36.5  C), temperature source Oral,  resp. rate 16, weight 68.2 kg (150 lb 5.6 oz), SpO2 95 %.  Wt Readings from Last 2 Encounters:   06/14/19 68.2 kg (150 lb 5.6 oz)   05/11/17 48.1 kg (106 lb)     Vital Signs with Ranges  Temp:  [97.3  F (36.3  C)-98.5  F (36.9  C)] 97.7  F (36.5  C)  Pulse:  [90-99] 93  Resp:  [16] 16  BP: ()/(69-79) 101/74  SpO2:  [95 %] 95 %    Constitutional: Awake, alert, cooperative, no apparent distress   Lungs: Clear to auscultation bilaterally, no crackles or wheezing   Cardiovascular: Regular rate and rhythm, normal S1 and S2, and no murmur noted   Abdomen: Normal bowel sounds, soft, non-distended, non-tender   Skin: No rashes, no cyanosis, no edema   Other:           Data:   All microbiology laboratory data reviewed.  Recent Labs   Lab Test 06/16/19  0947 06/14/19  1459   WBC  --  7.8   HGB 10.9* 11.0*   HCT  --  33.9*   MCV  --  95   PLT  --  366     Recent Labs   Lab Test 06/14/19  1459   CR 0.59     No lab results found.  Recent Labs   Lab Test 06/17/19  0830 06/16/19  1032 06/16/19  0630 05/11/17  1039   CULT Culture received and in progress.  Positive AFB results are called as soon as detected.    Final report to follow in 7 to 8 weeks.    Heavy growth  Normal luis to date   Light growth  Coagulase negative Staphylococcus  Susceptibility testing not routinely done  *  These bacteria are part of normal skin luis, but on occasion, may be true pathogens.    Clinical correlation must be applied to interpreting this microbiology result.  * Culture received and in progress.  Positive AFB results are called as soon as detected.    Final report to follow in 7 to 8 weeks.   50,000 to 100,000 colonies/mL mixed urogenital luis Susceptibility testing not   routinely done

## 2019-06-19 NOTE — PLAN OF CARE
DATE & TIME: 6/19/19 2569-3409                Cognitive Concerns/ Orientation : no concerns, A&Ox4   BEHAVIOR & AGGRESSION TOOL COLOR: green  CIWA SCORE: n/a     ABNL VS/O2:   MOBILITY: up independent in room; showered, up in chair, up ad toni  PAIN MANAGMENT: denies pain  DIET: regular, good intake  BOWEL/BLADDER: continent; denies any problems  ABNL LAB/BG: no labs today; sputums for AFB still pending results  DRAIN/DEVICES: PIV RAC patent  TELEMETRY RHYTHM:   SKIN:  Aspiration of R neck lymph node site with minimal drainage; dressing changed.  TESTS/PROCEDURES:   D/C DAY/GOALS/PLACE: pending results and ID plan  OTHER IMPORTANT INFO:  here; patient does speak english; denies any pain or other complaints. Remains in negative airflow room.

## 2019-06-20 LAB
ACID FAST STN SPEC QL: NORMAL
COPATH REPORT: NORMAL
SPECIMEN SOURCE: NORMAL
SPECIMEN SOURCE: NORMAL

## 2019-06-20 PROCEDURE — 87149 DNA/RNA DIRECT PROBE: CPT | Performed by: HOSPITALIST

## 2019-06-20 PROCEDURE — 87556 M.TUBERCULO DNA AMP PROBE: CPT | Performed by: HOSPITALIST

## 2019-06-20 PROCEDURE — 87015 SPECIMEN INFECT AGNT CONCNTJ: CPT | Performed by: HOSPITALIST

## 2019-06-20 PROCEDURE — 25000132 ZZH RX MED GY IP 250 OP 250 PS 637: Performed by: HOSPITALIST

## 2019-06-20 PROCEDURE — 12000000 ZZH R&B MED SURG/OB

## 2019-06-20 PROCEDURE — 40000809 ZZH STATISTIC NO DOCUMENTATION TO SUPPORT CHARGE

## 2019-06-20 PROCEDURE — 94640 AIRWAY INHALATION TREATMENT: CPT | Mod: 76

## 2019-06-20 PROCEDURE — 87116 MYCOBACTERIA CULTURE: CPT | Performed by: HOSPITALIST

## 2019-06-20 PROCEDURE — 99232 SBSQ HOSP IP/OBS MODERATE 35: CPT | Performed by: INTERNAL MEDICINE

## 2019-06-20 PROCEDURE — 87206 SMEAR FLUORESCENT/ACID STAI: CPT | Performed by: HOSPITALIST

## 2019-06-20 PROCEDURE — 87798 DETECT AGENT NOS DNA AMP: CPT | Performed by: HOSPITALIST

## 2019-06-20 RX ADMIN — EMTRICITABINE AND TENOFOVIR ALAFENAMIDE 1 TABLET: 200; 25 TABLET ORAL at 08:12

## 2019-06-20 RX ADMIN — SENNOSIDES AND DOCUSATE SODIUM 1 TABLET: 8.6; 5 TABLET ORAL at 08:12

## 2019-06-20 RX ADMIN — SENNOSIDES AND DOCUSATE SODIUM 1 TABLET: 8.6; 5 TABLET ORAL at 21:38

## 2019-06-20 RX ADMIN — DOLUTEGRAVIR SODIUM 50 MG: 50 TABLET, FILM COATED ORAL at 08:12

## 2019-06-20 ASSESSMENT — ACTIVITIES OF DAILY LIVING (ADL)
ADLS_ACUITY_SCORE: 10

## 2019-06-20 NOTE — PLAN OF CARE
DATE & TIME:  6/20 0700- 1530  Cognitive Concerns/ Orientation : A&Ox4   BEHAVIOR & AGGRESSION TOOL COLOR: green  CIWA SCORE: n/a     ABNL VS/O2:   MOBILITY: up independent in room; showered,    PAIN MANAGMENT: denies pain  DIET: regular, good intake  BOWEL/BLADDER: continent; denies any problems  ABNL LAB/BG: no labs today; sputums for AFB still pending results  DRAIN/DEVICES: no iv access  TELEMETRY RHYTHM: NA  SKIN:  Aspiration of R neck lymph node site with dressing,  TESTS/PROCEDURES:   D/C DAY/GOALS/PLACE: pending cultures and ID plan, possible discharge tomorrow.  OTHER IMPORTANT INFO: Airborne precautions maintained , right neck biopsy site dressing changed, noticed small amount of bloody drainage.

## 2019-06-20 NOTE — PLAN OF CARE
Pt is A&Ox4. VSS on RA. Denies pain. No cough noted. LS clear. Biopsy site to R side of neck with dressing CDI. Tolerating regular diet. Voids adequately. Independent in room. PIV SL. Neg pressure room and airborne precautions maintained. Plan pending lab results. ID following. Continue to monitor.

## 2019-06-20 NOTE — PLAN OF CARE
DATE & TIME:  6/20 [id number]  Cognitive Concerns/ Orientation : A&Ox4   BEHAVIOR & AGGRESSION TOOL COLOR: green  CIWA SCORE: n/a     ABNL VS/O2: n/a  MOBILITY: up independent in room   PAIN MANAGMENT: denies pain  DIET: regular, good intake  BOWEL/BLADDER: continent  ABNL LAB/BG: Neck lymph node biopsy positive for 3+ acid fast bacilli- MD notified  DRAIN/DEVICES: no iv access  TELEMETRY RHYTHM: NA  SKIN:  Aspiration of R neck lymph node site with dressing CDI  TESTS/PROCEDURES: n/a  D/C DAY/GOALS/PLACE: pending cultures and ID plan, possible discharge tomorrow.  OTHER IMPORTANT INFO: Airborne precautions maintained , right neck biopsy site CDI.

## 2019-06-20 NOTE — PROGRESS NOTES
[facility]    Hospitalist Progress Note    Assessment & Plan   Krystina Kinney is a pleasant 39 year old woman who is HIV positive and on antiretroviral therapy.  She immigrated here from [place] in 2012, and has a history of treatment for pulmonary tuberculosis about 15 or 20 years ago.  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  It is unclear if she was treated for this.  She was diagnosed with HIV in 2017.  She returned to [facility] this April for a visit and while there developed fever, sweats and a painful right supraclavicular mass.  She saw a doctor there who told her it was tuberculosis and she was started on (either 4 medications for TB or only pyrazinamide) and has been taking these daily for the past month+.  She presented to the emergency room 6/14 with pain and increased swelling in that area.  CT of the neck and chest show necrotic lymph nodes and a necrotic left upper lobe cavitary mass all consistent with tuberculosis.  She was admitted to the hospital for further management.  Current problems include:      CT scan showing confluent necrotic lymph nodes in the lower right neck and right axilla consistent with scrofula  Right neck/supraclavicular swelling/mass;  -consulted ID  -Complicated situation as pt initiated on emperic multidrug Rx in [place] which complicates evaluation and potential tx plan. She has been on , a 4-drug antituberculous regimen, so isolating the organisms may be difficult   -Seen by consult by Gen. Surgery for possible biopsy of this area. Gen surgery recommending FNA for now.  This will  be done on Monday, 6/17.  - cover this area with light, protective dressing until then (in case it ruptures over the weekend).   - drainage from neck wound cultured- light growth coag neg staff.  Clinically Very likely TB. Source control (ie I and D) not needed per ID. Site is looking better with decrease swelling and  tenderness.   -isolation for now  - sputum for AFB negative for acid-fast bacteria x2.  Gram stain negative.  -quantiferon TB gold positive but not clinically useful  -may ultimately need to treat for full 6 month course of 4 drug therapy if unable to obtain diagnoses microbiologically.   -cXR 6/17 shows: Left upper lobe masslike infiltrate is unchanged. Right lower neck and axillary soft tissue swelling is unchanged. No pleural effusion, no other infiltrates. 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 Decrease swelling and tenderness R neck base lymphadenitis  ID thinking pcr results later today on specimins so that a plan can be put in place tomorrow (6/21)     Addendum 17:30  Called by RN, Lymph node AFB stain positive for 3+ Acid fast bacilli.      Partially necrotic left upper lobe cavitary mass; stable.  - as above; further f/u by ID  -CXR 6/16: shows Left upper lobe masslike infiltrate is unchanged. Right  lower neck and axillary soft tissue swelling is unchanged. No pleural  effusion, no other infiltrates.  -ID following, ruling out active TB per ID   -isolation for now  -FNA of R neck LAD today  -- diagnostic testing pending of LN and sputum.  pcr results on specimens likely back today for plan to be put into place for Friday     HIV positive; on HAART- CD4 400   Followed by dr. Toro outpatient  - continue PTA [place] and [place]     History of treated pulmonary tuberculosis - sometime between 2000 and 2004; stable.  - continue respiratory isolation  - sputum for AFB negative for acid-fast bacteria.  Gram stain negative.  -QuantiFERON gold test is positive.  -FNA of neck LAD per surgery  -cx puss from abscess neck  - ID following  -clinically has recurrence. Awaiting PcR results on specimens. See above.      Anemia; likely due to chronic disease.  Hb 10-11 range. No  bleeding     Diet: Regular   DVT Prophylaxis: Pneumatic Compression Devices  Leone Catheter: not present  Code Status: Full      Disposition: per ID, possibly have plan in place tomorrow/Friday.        Caesar Corona MD  Text Page  (7am to 6pm)  Interval History   No concerns. No cp,cough or sob  No n/v/d/abd pain    -Data reviewed today: I reviewed all new labs and imaging results over the last 24 hours. I personally reviewed labs and imaging since admission    Physical Exam   Temp: 98.4  F (36.9  C) Temp src: Oral BP: 97/69 Pulse: 78   Resp: 18 SpO2: 98 % O2 Device: Nasal cannula    Vitals:    06/14/19 1949   Weight: 68.2 kg (150 lb 5.6 oz)     Vital Signs with Ranges  Temp:  [97.3  F (36.3  C)-98.9  F (37.2  C)] 98.4  F (36.9  C)  Pulse:  [78-88] 78  Resp:  [17-18] 18  BP: ()/(59-80) 97/69  SpO2:  [95 %-98 %] 98 %  I/O last 3 completed shifts:  In: 240 [P.O.:240]  Out: -     Constitutional: Nad, Sitting up in bed.  Nontoxic-appearing, smiling  Neck: fullness, abscess R neck base.  Area is indurated and slightly tender and less swollen. Draining slightly bloody and purulent drainage  No LAD other areas of neck exam  Respiratory: CTAB, breathing easily, no coughing  Cardiovascular: RRR no r/g/m. Not tachy  GI: soft, nt, nd  Skin/Integumen: no rash or edema  Neuro: nl speech and mentation  Psych: nl affect.     Medications       sodium chloride 0.9%  1,000 mL Intravenous Once     dolutegravir  50 mg Oral Daily     emtricitabine-tenofovir AF  1 tablet Oral Daily     senna-docusate  1 tablet Oral BID    Or     senna-docusate  2 tablet Oral BID     sodium chloride (PF)  3 mL Intracatheter Q8H       Data   Recent Labs   Lab 06/16/19  0947 06/14/19  1459   WBC  --  7.8   HGB 10.9* 11.0*   MCV  --  95   PLT  --  366   NA  --  137   POTASSIUM  --  3.8   CHLORIDE  --  104   CO2  --  32   BUN  --  10   CR  --  0.59   ANIONGAP  --  1*   RIMA  --  9.1   GLC  --  97       Imaging:   No results found for this or any  previous visit (from the past 24 hour(s)).

## 2019-06-20 NOTE — PROVIDER NOTIFICATION
MD Notification    Notified Person: MD    Notified Person Name: Chloe    Notification Date/Time: 6/20/19 3442     Notification Interaction: Web paging     Purpose of Notification: Neck lymph node biopsy taken on 6/18 results positive for 3+ acid fast bacilli    Orders Received:     Comments:

## 2019-06-20 NOTE — PROGRESS NOTES
[facility]  Infectious Disease Progress Note          Assessment and Plan:   IMPRESSION:   1.  This is a 39-year-old [place] immigrant female, well known to me in the clinic where I followed her for chronic stable HIV infection for which she is on ongoing treatment and well-controlled disease.   2.  While in [place] recently for 2 months, about a month ago, developed right neck swelling and inflammation, was seen there and diagnosed with scrofula without any diagnostic testing.  She was started on 4-drug antituberculous empiric treatment.  This is in fact likely the diagnosis for this complex lymphadenopathy structure in that area, but no micro confirmation.   3.  Prior history of pulmonary tuberculosis treated in [place] for which we do not know the details or whether full treatment was completed, but clinically improved.   4.  Current episode now further complicated by obvious lung involvement not known to have chronic scarring in this area in the lung, but has not had previous CT scan, this is presumptive pulmonary tuberculosis greatly complicating current situation.      RECOMMENDATIONS:   1.  Very complicated and difficult situation.  Very unfortunately has been started on empiric antituberculous in [place], despite the fact she had prior treatment for which by at least [place] standards would mandate getting diagnostic testing and get the actual organism due to R concerns.  She has been on , a 4-drug antituberculous regimen for 1 month, so isolating the organisms may be difficult  Nonetheless, an effort for diagnostic intervention here is required ,attempt to get three induced sputums for AFB.   2.  ? operative intervention diagnostically on the lymphadenopathy, Dr Dasilva note reviewed.  If she only had the lymph node problem, we might even consider holding TB drugs for a couple of weeks and then doing intervention, but given the lung involvement more urgent situation is present. Needle  aspiration yield is low even wo issue of pretreatment but path should be diagnostic at least to prove TB is the dx, and PCR significant yield  3.  Isolation for now, although given she has been on 4-drug TB therapy and and not coughing,  is not likely to still be contagious even if the pulmonary process is tuberculosis.   4. Possibly we are going to land on a situation were unable to get a diagnosis microbiologically in which case, in all likelihood we would proceed with a full 6-month course of 4-drug therapy, assuming possible underlying resistance.  This is quite undesirable on many levels, but for now proceed to try to make a diagnosis.  This is a very difficult situation.   5 CXR to see if we see this on plain film(prior US CXRs neg),abnormality is very obvious and thus proven this is new, and likely is TB  6 Get needle attempt, bacterial cx wd neg except skin luis (CN staph), this type of drainage not common but occurs with scrofula at times(not contagious)  On needle AFB smear/cx and TB PCR, await sputum smears, 1 smear back neg and was adequate specimen, pending TB PCR on 1 of sputums direct. Conceivably still to be + even with tx  7 Continue HAART, undet virus, T cells 300(stable)  8 Once prelim info back will discuss again home plan, awaiting either PCR or path proof TB is dx , likely back today.  Make plan tomorrow                 Interval History:   no new complaints and doing well; no cp, sob, n/v/d, or abd pain. 1 100.2 temp of some note, not coughing ? Successful induction as some samples sent all pending  CXR obvious new area seen on plain film compared to normal prior              Medications:       sodium chloride 0.9%  1,000 mL Intravenous Once     dolutegravir  50 mg Oral Daily     emtricitabine-tenofovir AF  1 tablet Oral Daily     senna-docusate  1 tablet Oral BID    Or     senna-docusate  2 tablet Oral BID     sodium chloride (PF)  3 mL Intracatheter Q8H                  Physical Exam:    Blood pressure 97/59, pulse 87, temperature 98.9  F (37.2  C), temperature source Oral, resp. rate 18, weight 68.2 kg (150 lb 5.6 oz), SpO2 96 %.  Wt Readings from Last 2 Encounters:   06/14/19 68.2 kg (150 lb 5.6 oz)   05/11/17 48.1 kg (106 lb)     Vital Signs with Ranges  Temp:  [97.3  F (36.3  C)-98.9  F (37.2  C)] 98.9  F (37.2  C)  Pulse:  [87-93] 87  Resp:  [16-18] 18  BP: ()/(59-80) 97/59  SpO2:  [95 %-96 %] 96 %    Constitutional: Awake, alert, cooperative, no apparent distress   Lungs: Clear to auscultation bilaterally, no crackles or wheezing   Cardiovascular: Regular rate and rhythm, normal S1 and S2, and no murmur noted   Abdomen: Normal bowel sounds, soft, non-distended, non-tender   Skin: No rashes, no cyanosis, no edema   Other:           Data:   All microbiology laboratory data reviewed.  Recent Labs   Lab Test 06/16/19  0947 06/14/19  1459   WBC  --  7.8   HGB 10.9* 11.0*   HCT  --  33.9*   MCV  --  95   PLT  --  366     Recent Labs   Lab Test 06/14/19  1459   CR 0.59     No lab results found.  Recent Labs   Lab Test 06/17/19  0830 06/16/19  1032 06/16/19  0630 05/11/17  1039   CULT Heavy growth  Normal luis    Culture received and in progress.  Positive AFB results are called as soon as detected.    Final report to follow in 7 to 8 weeks.   Light growth  Coagulase negative Staphylococcus  Susceptibility testing not routinely done  *  These bacteria are part of normal skin luis, but on occasion, may be true pathogens.    Clinical correlation must be applied to interpreting this microbiology result.  * Culture received and in progress.  Positive AFB results are called as soon as detected.    Final report to follow in 7 to 8 weeks.   50,000 to 100,000 colonies/mL mixed urogenital luis Susceptibility testing not   routinely done

## 2019-06-21 VITALS
SYSTOLIC BLOOD PRESSURE: 99 MMHG | WEIGHT: 150.35 LBS | TEMPERATURE: 97.6 F | RESPIRATION RATE: 18 BRPM | OXYGEN SATURATION: 96 % | BODY MASS INDEX: 26.22 KG/M2 | HEART RATE: 74 BPM | DIASTOLIC BLOOD PRESSURE: 69 MMHG

## 2019-06-21 PROCEDURE — 25000132 ZZH RX MED GY IP 250 OP 250 PS 637: Performed by: HOSPITALIST

## 2019-06-21 PROCEDURE — 99239 HOSP IP/OBS DSCHRG MGMT >30: CPT | Performed by: INTERNAL MEDICINE

## 2019-06-21 RX ADMIN — DOLUTEGRAVIR SODIUM 50 MG: 50 TABLET, FILM COATED ORAL at 07:50

## 2019-06-21 RX ADMIN — EMTRICITABINE AND TENOFOVIR ALAFENAMIDE 1 TABLET: 200; 25 TABLET ORAL at 07:51

## 2019-06-21 RX ADMIN — SENNOSIDES AND DOCUSATE SODIUM 1 TABLET: 8.6; 5 TABLET ORAL at 07:50

## 2019-06-21 ASSESSMENT — ACTIVITIES OF DAILY LIVING (ADL)
ADLS_ACUITY_SCORE: 10

## 2019-06-21 NOTE — PROGRESS NOTES
[facility]  Infectious Disease Progress Note          Assessment and Plan:   IMPRESSION:   1.  This is a 39-year-old [place] immigrant female, well known to me in the clinic where I followed her for chronic stable HIV infection for which she is on ongoing treatment and well-controlled disease.   2.  While in [place] recently for 2 months, about a month ago, developed right neck swelling and inflammation, was seen there and diagnosed with scrofula without any diagnostic testing.  She was started on 4-drug antituberculous empiric treatment.  This is in fact likely the diagnosis for this complex lymphadenopathy structure in that area, but no micro confirmation.   3.  Prior history of pulmonary tuberculosis treated in [place] for which we do not know the details or whether full treatment was completed, but clinically improved.   4.  Current episode now further complicated by obvious lung involvement not known to have chronic scarring in this area in the lung, but has not had previous CT scan, this is presumptive pulmonary tuberculosis greatly complicating current situation.      RECOMMENDATIONS:   1.  Very complicated and difficult situation.  Very unfortunately has been started on empiric antituberculous in [place], despite the fact she had prior treatment for which by at least [place] standards would mandate getting diagnostic testing and get the actual organism due to R concerns.  She has been on , a 4-drug antituberculous regimen for 1 month, so isolating the organisms may be difficult  Nonetheless, an effort for diagnostic intervention here is required ,attempt to get three induced sputums for AFB.   2.  ? operative intervention diagnostically on the lymphadenopathy, Dr Dasilva note reviewed.  If she only had the lymph node problem, we might even consider holding TB drugs for a couple of weeks and then doing intervention, but given the lung involvement more urgent situation is present. Needle  aspiration yield is low even wo issue of pretreatment but path should be diagnostic at least to prove TB is the dx, and PCR significant yield  3.  Isolation for now, although given she has been on 4-drug TB therapy and and not coughing,  is not likely to still be contagious even if the pulmonary process is tuberculosis.   4. Possibly we are going to land on a situation were unable to get a diagnosis microbiologically in which case, in all likelihood we would proceed with a full 6-month course of 4-drug therapy, assuming possible underlying resistance.  This is quite undesirable on many levels, but for now proceed to try to make a diagnosis.  This is a very difficult situation.   5 CXR to see if we see this on plain film(prior US CXRs neg),abnormality is very obvious and thus proven this is new, and likely is TB  6 Get needle attempt, bacterial cx wd neg except skin luis (CN staph), this type of drainage not common but occurs with scrofula at times(not contagious)  On needle AFB smear/cx and TB PCR, await sputum smears, 1 smear back neg and was adequate specimen, pending TB PCR on 1 of sputums direct. Conceivably still to be + even with tx  7 Continue HAART, undet virus, T cells 300(stable)  8 Enough info back to let go home now, sputum AFB neg and not clinical lubg TB so no iso at home, AFB smear and path + from LN so dx not in doubt, PCR are pending, hope is either cx + or PCR + so we can get R partial answer, home off TB meds and we will Follow-up on outpt data                 Interval History:   no new complaints and doing well; no cp, sob, n/v/d, or abd pain. 1 100.2 temp of some note, not coughing ? Successful induction as some samples sent all pending  CXR obvious new area seen on plain film compared to normal prior dfata as above              Medications:       sodium chloride 0.9%  1,000 mL Intravenous Once     dolutegravir  50 mg Oral Daily     emtricitabine-tenofovir AF  1 tablet Oral Daily      senna-docusate  1 tablet Oral BID    Or     senna-docusate  2 tablet Oral BID     sodium chloride (PF)  3 mL Intracatheter Q8H                  Physical Exam:   Blood pressure 99/69, pulse 74, temperature 97.6  F (36.4  C), temperature source Oral, resp. rate 18, weight 68.2 kg (150 lb 5.6 oz), SpO2 96 %.  Wt Readings from Last 2 Encounters:   06/14/19 68.2 kg (150 lb 5.6 oz)   05/11/17 48.1 kg (106 lb)     Vital Signs with Ranges  Temp:  [97.6  F (36.4  C)-98.7  F (37.1  C)] 97.6  F (36.4  C)  Pulse:  [74-89] 74  Resp:  [18] 18  BP: ()/(69-81) 99/69  SpO2:  [96 %-97 %] 96 %    Constitutional: Awake, alert, cooperative, no apparent distress   Lungs: Clear to auscultation bilaterally, no crackles or wheezing   Cardiovascular: Regular rate and rhythm, normal S1 and S2, and no murmur noted   Abdomen: Normal bowel sounds, soft, non-distended, non-tender   Skin: No rashes, no cyanosis, no edema   Other:           Data:   All microbiology laboratory data reviewed.  Recent Labs   Lab Test 06/16/19  0947 06/14/19  1459   WBC  --  7.8   HGB 10.9* 11.0*   HCT  --  33.9*   MCV  --  95   PLT  --  366     Recent Labs   Lab Test 06/14/19  1459   CR 0.59     No lab results found.  Recent Labs   Lab Test 06/18/19  1000 06/18/19  0811 06/17/19  0830 06/16/19  1032 06/16/19  0630 05/11/17  1039   CULT Culture received and in progress.  Positive AFB results are called as soon as detected.    Final report to follow in 7 to 8 weeks.    Assayed at [facility] [address] [place] [address] [phone]  Culture received and in progress.  Positive AFB results are called as soon as detected.    Final report to follow in 7 to 8 weeks.    Assayed at [facility] [address] [place] [address] [phone] Culture received and in progress.  Positive AFB results are called as soon as detected.    Final report to follow in 7 to 8 weeks.    Assayed at [facility] [address] [place] [id number]  [phone] Heavy growth  Normal luis    Culture received and in progress.  Positive AFB results are called as soon as detected.    Final report to follow in 7 to 8 weeks.   Light growth  Coagulase negative Staphylococcus  Susceptibility testing not routinely done  *  These bacteria are part of normal skin luis, but on occasion, may be true pathogens.    Clinical correlation must be applied to interpreting this microbiology result.  * Culture received and in progress.  Positive AFB results are called as soon as detected.    Final report to follow in 7 to 8 weeks.   50,000 to 100,000 colonies/mL mixed urogenital luis Susceptibility testing not   routinely done

## 2019-06-21 NOTE — PROGRESS NOTES
Dr Toro was here and noted pt could be discharged.  No isolation precautions needed at this time.  He wanted to see her in 2 weeks.  Unfortunately, the earliest possibility to schedule with him was not until 7/16/19.  Dr Toro intends to continue to work on further data between now and then.  Pt has follow up with her [facility] PCP next week.

## 2019-06-21 NOTE — PLAN OF CARE
DATE & TIME:  6/20/19 1900-0730   Cognitive Concerns/Orientation : A&Ox4   BEHAVIOR & AGGRESSION TOOL COLOR: green  CIWA SCORE: n/a     ABNL VS/O2: n/a  MOBILITY: up independent in room   PAIN MANAGMENT: denies pain  DIET: regular, good intake  BOWEL/BLADDER: continent  ABNL LAB/BG: Neck lymph node biopsy positive for 3+ acid fast bacilli- MD aware.  DRAIN/DEVICES: no iv access  TELEMETRY RHYTHM: NA  SKIN:  Aspiration of R neck lymph node site with dressing CDI  TESTS/PROCEDURES: n/a  D/C DAY/GOALS/PLACE: pending cultures and ID plan.  OTHER IMPORTANT INFO: Airborne precautions maintained.

## 2019-06-21 NOTE — DISCHARGE SUMMARY
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 Toro in about 1-2 weeks   for follow up of your TB.           Unresulted Labs Ordered in the Past 30 Days of this Admission     Date and Time Order Name Status Description    6/20/2019 1053 AFB Stain Non Blood In process     6/20/2019 1053 AFB Culture Non Blood In process     6/18/2019 1000 AFB Culture Non Blood Preliminary     6/18/2019 1000 MTB Complex and Resistance In process     6/18/2019 1000 AFB Stain Non Blood Preliminary     6/18/2019 1000 AFB Culture Non Blood Preliminary     6/18/2019 1000 MTB Complex and Resistance In process     6/17/2019 1415 AFB Culture Non Blood Preliminary     6/17/2019 0830 AFB Culture Non Blood Preliminary     6/16/2019 0630 AFB Culture Non Blood Preliminary       These results will be followed up by infectious disease    Hospital Course   Krystina Kinney is a 39 year old female with PMH HIV on HAART, hx pulmonary TB s/p treatment, who was admitted on 6/14/2019 with complicated tuberculosis presentation.    Patient immigrated here from [facility] in 2012, and has a history of treatment for pulmonary tuberculosis about 15 or 20 years ago.  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  It is unclear if she was treated for this.  She was diagnosed with HIV in 2017.  She returned to [facility] this April for a visit and while there developed fever, sweats and a painful right supraclavicular mass.  She saw a doctor there who  told her it was tuberculosis and she was started on (either 4 medications for TB or only pyrazinamide) and has been taking these daily for the past month+.  She presented to the emergency room 6/14 with pain and increased swelling in that area.  CT of the neck and chest show necrotic lymph nodes and a necrotic left upper lobe cavitary mass all consistent with tuberculosis.   Biopsy of mass positive for acid fast bacilli.   Quantiferon TB gold positive but not clinically useful   Given positive positive biopsy of scrofula, per ID plan is to follow up in clinic without any antituberculous treatment, pending culture and sensitivity results.     History of treated pulmonary tuberculosis ~[id number]  Lung cavitary lesions  Partially necrotic left upper lobe cavitary mass seen on CT. AFB sputum samples have so far been negative x2. No evidence of active pulmonary TB this admission.  - Patient does not need to be on iso since AFB have been negative so far.    HIV positive; on HAART- CD4 400   Followed by dr. Toro outpatient  - continue PTA Descovy and Tivacay       Consultations This Hospital Stay   INFECTIOUS DISEASES IP CONSULT  SURGERY GENERAL IP CONSULT    Code Status   Full Code    Time Spent on this Encounter   I, Alex Gottlieb, personally saw the patient today and spent approximately 35 minutes discharging this patient.       Alex Gottlieb MD  [facility]  ______________________________________________________________________    Physical Exam   Vital Signs: Temp: 97.6  F (36.4  C) Temp src: Oral BP: 99/69 Pulse: 74   Resp: 18 SpO2: 96 % O2 Device: None (Room air)    Weight: 150 lbs 5.6 oz    Constitutional: Female in NAD  HEENT: Eyes nonicteric, oral mucosa moist, right base of neck noted firm lymph  Node with dressing overlying  Cardiovascular: RRR, normal S1/2, no m/r/g  Respiratory: CTAB, no wheezing or crackles  Vascular: No LE pitting edema  GI: Normoactive bowel sounds, nontender,  nondistended  Skin/Integumen: No rashes  Neuro/Psych: Appropriate affect and mood. A&Ox3, moves all extremities       Primary Care Physician   Johnny Vinson    Discharge Disposition   Discharged to home  Condition at discharge: Stable    Significant Results and Procedures   Most Recent 3 CBC's:  Recent Labs   Lab Test 06/16/19  0947 06/14/19  1459   WBC  --  7.8   HGB 10.9* 11.0*   MCV  --  95   PLT  --  366     Most Recent 3 BMP's:  Recent Labs   Lab Test 06/14/19  1459      POTASSIUM 3.8   CHLORIDE 104   CO2 32   BUN 10   CR 0.59   ANIONGAP 1*   RIMA 9.1   GLC 97   ,   Results for orders placed or performed during the hospital encounter of 06/14/19   Soft tissue neck CT w contrast    Narrative    CT SCAN OF THE NECK WITH CONTRAST  6/14/2019 5:04 PM     HISTORY: Lower right supraclavicular/anterior cervical swelling,  evaluate abscess, lymphadenitis. History of tuberculosis, original  biopsy of nodes from 2011 showed chronic suppurative lymphadenitis  suggestive of tuberculosis. Patient being treated with Pyrazinamide.    TECHNIQUE: Axial images and coronal reformations. Radiation dose for  this scan was reduced using automated exposure control, adjustment of  the mA and/or kV according to patient size, or iterative  reconstruction technique. 90mL Isovue-370 IV.    COMPARISON: None.    FINDINGS: There is an irregularly-shaped mass in the lower anterior  right neck with irregular peripheral enhancement around low  attenuation center with no calcification. The mass is 4.9 x 5.0 x 5.3  cm diameter and is in the level V lymph node chain. More posteriorly  and inferiorly, there are additional lesions consistent with necrotic  nodes in the supraclavicular fossa, and another cluster similar nodes  are seen high in the right axilla. There is no adenopathy in the left  neck.    The orbits, sinuses, parotid glands, submandibular glands and thyroid  gland appear normal. The tongue and airway appear normal.  No bone  lesions are seen.    Scans through the upper lungs show a partially cavitary mass or  infiltrate in the left upper lobe centrally measuring 4.5 x 2.6 x 2.6  cm diameter. In addition, there are multiple air cysts bilaterally  throughout both lungs. Most of these are thin-walled, but a few have  slightly thickened walls.      Impression    IMPRESSION:  1. Confluent necrotic lymph nodes in the lower right neck and right  axilla consistent with scrofula.  2. Partially necrotic left upper lobe mass. Differential diagnosis  includes tuberculosis and lung cancer.  3. Multiple air cysts in the lungs. Differential diagnosis is broad  and includes emphysema, alpha-1 antitrypsin deficiency and  lymphangiomyomatosis.    I called the report to Dr. Cecil Mullins in the emergency room at 5:40  PM on 6/14/2019.    DONNA BELLE MD   Chest CT w/o contrast    Narrative    CT CHEST WITHOUT CONTRAST   6/14/2019 6:50 PM     HISTORY: Further evaluation of lung lesions    TECHNIQUE: No IV contrast material. Radiation dose for this scan was  reduced using automated exposure control, adjustment of the mA and/or  kV according to patient size, or iterative reconstruction technique.    COMPARISON: CT neck 6/14/2019    FINDINGS: Noncontrast images of the chest less well demonstrate right  supraclavicular adenopathy. There are multiple benign-appearing,  thin-walled cystic lesions scattered throughout the lungs. There is  airspace opacity in the anterolateral left upper lobe with some  cavitation.    There is a left upper lobe nodule that measures 6 mm (series 4, image  131). There is another nodule in the right lower lobe that measures 6  mm (series 4, image 152).    No pleural or pericardial effusion. No mediastinal or hilar  adenopathy. There are mildly enlarged left periaortic lymph nodes, one  of which at the inferior margin of the study measures 2.4 x 1.4 cm  (series 2, image 60).      Impression    IMPRESSION:  1. Cavitary  airspace opacity in the left upper lobe is likely  infectious and active tuberculosis is a consideration. This is  associated with necrotic supraclavicular adenopathy on the right may  represent scrofula.  2. Cystic lung disease with thin-walled, benign-appearing cysts. This  may be related Langerhans cell histiocytosis or  lymphangioleiomyomatosis. Emphysema considered less likely.  3. Incidental 6 mm pulmonary nodules. Consider follow-up CT in 6-12  months.  4. Probable retroperitoneal adenopathy is incompletely evaluated on  this study.    NICK ARRIAZA MD   XR Chest 2 Views    Narrative    CHEST TWO VIEWS  6/16/2019 9:12 AM     HISTORY: Tuberculosis.    COMPARISON: CT neck and chest 6/14/2019      Impression    IMPRESSION: Left upper lobe masslike infiltrate is unchanged. Right  lower neck and axillary soft tissue swelling is unchanged. No pleural  effusion, no other infiltrates.    DONNA BELLE MD   US Biopsy Lymph Node Core    Narrative    Procedure(s):  Ultrasound-guided lymph node biopsy    Date of Procedure: 6/18/2019    Operators:    Samuel Cabello MD    Medications:  1% Lidocaine SQ    Contrast: None    Referenced air kerma: 0 mGy  Fluoroscopy time: 0 minutes    Estimated blood loss: Minimal    Complications: None    Pre-procedure diagnosis: Tuberculosis  Post-procedure diagnosis: Same    Clinical History/Indication: 39-year-old female with active  tuberculosis in large necrotic lymph node in the right supraclavicular  region.    Procedure and Findings:  Following a discussion of the risks, benefits, indications, and  alternatives to treatment, appropriate informed consent was obtained  from the patient.  The patient was seen at bedside and placed supine  on the table. The right supraclavicular region was prepped and draped  in the usual sterile fashion.  A timeout was performed per hospital  universal protocol policy to confirm the correct patient, site and  procedure to be performed.    Preliminary  ultrasound of the right neck was performed to assess for  appropriate sites for access.  These images identify large necrotic  lymph node and an ultrasound image was archived. An appropriate site  for skin access was identified and the overlying soft tissues were  infiltrated with 1% Lidocaine for local anesthesia. Under direct  ultrasound visualization an 17-gauge trocar needle was advanced into  the target lesion. Approximately 20 mL of purulent fluid was aspirated  through the trocar. Then, through the trocar needle, an 18-gauge Bard  spring loaded biopsy was used to obtain 4 core biopsy specimen. The  biopsy samples were visually inspected and when they were found to be  adequate they were submitted to pathology. All needles were removed  and hemostasis was achieved with manual compression. A sterile  dressing was applied.     Throughout the procedure, the patient was monitored by a radiology  nurse for cardiac rhythm, blood pressure and oxygen saturation which  remained stable. The patient tolerated the procedure well and left  interventional radiology in stable condition.      Impression    Impression:  Ultrasound-guided targeted lymph node biopsy, as described    CHRIS RODRIGUEZ MD       Discharge Orders      Reason for your hospital stay    You were hospitalized for tuberculosis.     Follow-up and recommended labs and tests     Follow up with infectious disease clinic Dr. Toro in about 1-2 weeks for follow up of your TB.     Activity    Your activity upon discharge: activity as tolerated     When to contact your care team    Call your primary doctor if you have any of the following: worsening cough, coughing blood, or fevers.     Full Code     Diet    Follow this diet upon discharge: Orders Placed This Encounter      Combination Diet Regular Diet Adult     Discharge Medications   Current Discharge Medication List      CONTINUE these medications which have NOT CHANGED    Details   DESCOVY 200-25 MG per  tablet Take 1 tablet by mouth daily  Refills: 2      NONFORMULARY Drug is from eric.   Contains Rifampin 150mg, INH 75mg, Ethambutol 275mg, pyrazinamide 400mg  Patient is taking total of 4 pills per day.    Has a 1 week supply with her  SIG : 2 tablets bid.      TIVICAY 50 MG tablet Take 50 mg by mouth daily  Refills: 0           Allergies   No Known Allergies

## 2019-06-21 NOTE — PLAN OF CARE
DATE & TIME:  6/21/19 9425-0959   Cognitive Concerns/Orientation : A&Ox4   BEHAVIOR & AGGRESSION TOOL COLOR: green  CIWA SCORE: n/a     ABNL VS/O2: n/a  MOBILITY: up independent in room   PAIN MANAGMENT: denies pain  DIET: regular, good intake  BOWEL/BLADDER: continent  ABNL LAB/BG: Neck lymph node biopsy positive for 3+ acid fast bacilli- MD aware.  DRAIN/DEVICES: no iv access  TELEMETRY RHYTHM: NA  SKIN:  Aspiration of R neck lymph node site with dressing .  TESTS/PROCEDURES: n/a  D/C DAY/GOALS/PLACE: pending cultures and ID plan.  OTHER IMPORTANT INFO: Airborne precautions maintained.

## 2019-06-21 NOTE — PLAN OF CARE
Discharge    Patient discharged to home via uber with self    Listed belongings gathered and returned to patient. Yes  Care Plan and Patient education resolved: Yes  Prescriptions if needed, hard copies sent with patient  NA  Home and hospital acquired medications returned to patient: NA  Medication Bin checked and emptied on discharge Yes  Follow up appointment made for patient: Yes

## 2019-06-21 NOTE — PROGRESS NOTES
"BRIEF CLINICAL NUTRITION SERVICES ASSESSMENT     REASON FOR ASSESSMENT:  LOS     NUTRITION HISTORY:  - PMH: HIV+, hx treatment for TB 15-20 years ago. Here for r/o TB.  - Presented with pain, increased swelling to supraclavicular mass.   - Unable to visit with pt  - RADHA     CURRENT DIET AND INTAKE:  Diet: Regular  Intakes: Consisently % intakes recorded. RN documenting good intake.       ANTHROPOMETRICS:  Height: 5' 3.5\"  Weight: 150 lbs 5.6 oz, 68.2 kg  Body mass index is 26.22 kg/m .  Weight Status:  Overweight BMI 25-29.9  IBW: 53.4 kg  % IBW: 128%  Weight History:   Wt Readings from Last 10 Encounters:   06/14/19 68.2 kg (150 lb 5.6 oz)   05/11/17 48.1 kg (106 lb)       LABS:  Labs reviewed - none since 6/15    MEDS:  Meds reviewed   Bowel program     MALNUTRITION:  Patient does not meet two of the following criteria necessary for diagnosing malnutrition: significant weight loss, reduced intake, subcutaneous fat loss, muscle loss or fluid retention     NUTRITION INTERVENTION:  Nutrition Diagnosis:  No nutrition diagnosis at this time.     Implementation:  Nutrition Education: (Per Provider order if indicated, not appropriate at this time due to patient condition, patient declined, refer to education activity - Nutrition Education survival information)     FOLLOW UP/MONITORING:   Will re-evaluate in 7 - 10 days, or sooner, if re-consulted.        Veronica Washington RD, LD  3rd floor/ICU: [phone]  All other floors: [phone]  Weekend/holiday: [phone]  Office: [phone]        "

## 2019-06-21 NOTE — PROGRESS NOTES
[facility]    Hospitalist Progress Note    Interval History   - No complaints today no cough, taking PO, ambulating in room mostly  -  used    Assessment & Plan   Summary: Krystina Kinney is a 39 year old female with PMH HIV on HAART, hx pulmonary TB s/p treatment, who was admitted on 6/14/2019 with complicated pulmonary TB and tuberculous lymphadenitis.    Right axilla/neck scrofula  Patient immigrated here from [facility] in 2012, and has a history of treatment for pulmonary tuberculosis about 15 or 20 years ago.  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  It is unclear if she was treated for this.  She was diagnosed with HIV in 2017.  She returned to [place] this April for a visit and while there developed fever, sweats and a painful right supraclavicular mass.  She saw a doctor there who told her it was tuberculosis and she was started on (either 4 medications for TB or only pyrazinamide) and has been taking these daily for the past month+.  She presented to the emergency room 6/14 with pain and increased swelling in that area.  CT of the neck and chest show necrotic lymph nodes and a necrotic left upper lobe cavitary mass all consistent with tuberculosis. Biopsy of mass positive for acid fast bacilli.   Complicated situation as pt initiated on emperic multidrug Rx in [place] which complicates evaluation and potential tx plan. She has been on , a 4-drug antituberculous regimen, so isolating the organisms may be difficult   -Seen by consult by Gen. Surgery for possible biopsy of this area. Gen surgery recommending FNA for now.  This will  be done on Monday, 6/17.  - cover this area with light, protective dressing until then (in case it ruptures over the weekend).   - drainage from neck wound cultured- light growth coag neg staff.  Clinically Very likely TB. Source control (ie I and D) not needed per ID. Site is looking better  with decrease swelling and tenderness.   -isolation for now  - sputum for AFB negative for acid-fast bacteria x2.  Gram stain negative.  -quantiferon TB gold positive but not clinically useful  -may ultimately need to treat for full 6 month course of 4 drug therapy if unable to obtain diagnoses microbiologically.   -cXR 6/17 shows: Left upper lobe masslike infiltrate is unchanged. Right lower neck and axillary soft tissue swelling is unchanged. No pleural effusion, no other infiltrates. 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 Decrease swelling and tenderness R neck base lymphadenitis  ID thinking pcr results later today on specimins so that a plan can be put in place tomorrow (6/21)    Pulmonary TB: Partially necrotic left upper lobe cavitary mass seen on CT.   - ID following, ruling out active TB per ID   - isolation for now  - See above for management     HIV positive; on HAART- CD4 400   Followed by dr. Toro outpatient  - continue PTA Descovy and Tivacay     History of treated pulmonary tuberculosis - sometime between 2000 and 2004.    Anemia; likely due to chronic disease.  Hb 10-11 range. No bleeding     DVT Prophylaxis: Pneumatic Compression Devices  Code Status: Full Code  PT/OT: not needed    Disposition: Expected discharge pending ID management    Alex Gottlieb MD  Text Page  (7am to 6pm)  -Data reviewed today: I reviewed all new labs and imaging results over the last 24 hours.     Physical Exam   Temp: 97.6  F (36.4  C) Temp src: Oral BP: 99/69 Pulse: 74   Resp: 18 SpO2: 96 % O2 Device: None (Room air)    Vitals:    06/14/19 1949   Weight: 68.2 kg (150 lb 5.6 oz)     Vital Signs with Ranges  Temp:  [97.6  F (36.4  C)-98.7  F (37.1  C)] 97.6  F (36.4  C)  Pulse:  [74-89] 74  Resp:  [18] 18  BP: ()/(69-81) 99/69  SpO2:  [96 %-97 %] 96 %  I/O last 3 completed shifts:  In:  680 [P.O.:680]  Out: -   O2 requirements: none    Constitutional: Female in NAD  HEENT: Eyes nonicteric, oral mucosa moist, right base of neck noted firm lymph  Node with dressing overlying  Cardiovascular: RRR, normal S1/2, no m/r/g  Respiratory: CTAB, no wheezing or crackles  Vascular: No LE pitting edema  GI: Normoactive bowel sounds, nontender, nondistended  Skin/Integumen: No rashes  Neuro/Psych: Appropriate affect and mood. A&Ox3, moves all extremities    Medications       sodium chloride 0.9%  1,000 mL Intravenous Once     dolutegravir  50 mg Oral Daily     emtricitabine-tenofovir AF  1 tablet Oral Daily     senna-docusate  1 tablet Oral BID    Or     senna-docusate  2 tablet Oral BID     sodium chloride (PF)  3 mL Intracatheter Q8H       Data   Recent Labs   Lab 06/16/19  0947   HGB 10.9*       Imaging:   No results found for this or any previous visit (from the past 24 hour(s)).

## 2019-06-24 ENCOUNTER — TELEPHONE (OUTPATIENT)
Dept: FAMILY MEDICINE | Facility: CLINIC | Age: 39
End: 2019-06-24

## 2019-06-24 NOTE — TELEPHONE ENCOUNTER
ED / Discharge Outreach Protocol    Patient Contact    Attempt # 1    Was call answered?  No.  Left message on voicemail with information to call me back.    Galina ALMARAZ RN,BSN

## 2019-06-25 ENCOUNTER — HOSPITAL ENCOUNTER (INPATIENT)
Facility: CLINIC | Age: 39
LOS: 3 days | Discharge: HOME | DRG: 976 | End: 2019-06-28
Attending: INTERNAL MEDICINE | Admitting: INTERNAL MEDICINE
Payer: COMMERCIAL

## 2019-06-25 PROBLEM — A19.9: Status: ACTIVE | Noted: 2019-06-25

## 2019-06-25 LAB
ACID FAST STN SPEC QL: ABNORMAL
CREAT SERPL-MCNC: 0.63 MG/DL (ref 0.52–1.04)
GFR SERPL CREATININE-BSD FRML MDRD: >90 ML/MIN/{1.73_M2}
PLATELET # BLD AUTO: 389 10E9/L (ref 150–450)
SPECIMEN SOURCE: ABNORMAL

## 2019-06-25 PROCEDURE — 25000132 ZZH RX MED GY IP 250 OP 250 PS 637: Performed by: INTERNAL MEDICINE

## 2019-06-25 PROCEDURE — 25000128 H RX IP 250 OP 636: Performed by: INTERNAL MEDICINE

## 2019-06-25 PROCEDURE — 85049 AUTOMATED PLATELET COUNT: CPT | Performed by: INTERNAL MEDICINE

## 2019-06-25 PROCEDURE — 99223 1ST HOSP IP/OBS HIGH 75: CPT | Mod: AI | Performed by: INTERNAL MEDICINE

## 2019-06-25 PROCEDURE — 82565 ASSAY OF CREATININE: CPT | Performed by: INTERNAL MEDICINE

## 2019-06-25 PROCEDURE — 12000000 ZZH R&B MED SURG/OB

## 2019-06-25 PROCEDURE — 36415 COLL VENOUS BLD VENIPUNCTURE: CPT | Performed by: INTERNAL MEDICINE

## 2019-06-25 RX ORDER — POTASSIUM CHLORIDE 29.8 MG/ML
20 INJECTION INTRAVENOUS
Status: DISCONTINUED | OUTPATIENT
Start: 2019-06-25 | End: 2019-06-28 | Stop reason: HOSPADM

## 2019-06-25 RX ORDER — ISONIAZID 300 MG/1
300 TABLET ORAL DAILY
Status: DISCONTINUED | OUTPATIENT
Start: 2019-06-26 | End: 2019-06-28 | Stop reason: HOSPADM

## 2019-06-25 RX ORDER — POTASSIUM CHLORIDE 7.45 MG/ML
10 INJECTION INTRAVENOUS
Status: DISCONTINUED | OUTPATIENT
Start: 2019-06-25 | End: 2019-06-28 | Stop reason: HOSPADM

## 2019-06-25 RX ORDER — POTASSIUM CL/LIDO/0.9 % NACL 10MEQ/0.1L
10 INTRAVENOUS SOLUTION, PIGGYBACK (ML) INTRAVENOUS
Status: DISCONTINUED | OUTPATIENT
Start: 2019-06-25 | End: 2019-06-28 | Stop reason: HOSPADM

## 2019-06-25 RX ORDER — POTASSIUM CHLORIDE 1.5 G/1.58G
20-40 POWDER, FOR SOLUTION ORAL
Status: DISCONTINUED | OUTPATIENT
Start: 2019-06-25 | End: 2019-06-28 | Stop reason: HOSPADM

## 2019-06-25 RX ORDER — ONDANSETRON 4 MG/1
4 TABLET, ORALLY DISINTEGRATING ORAL EVERY 6 HOURS PRN
Status: DISCONTINUED | OUTPATIENT
Start: 2019-06-25 | End: 2019-06-28 | Stop reason: HOSPADM

## 2019-06-25 RX ORDER — ETHAMBUTOL HYDROCHLORIDE 400 MG/1
1200 TABLET, FILM COATED ORAL DAILY
Status: DISCONTINUED | OUTPATIENT
Start: 2019-06-26 | End: 2019-06-28 | Stop reason: HOSPADM

## 2019-06-25 RX ORDER — NALOXONE HYDROCHLORIDE 0.4 MG/ML
.1-.4 INJECTION, SOLUTION INTRAMUSCULAR; INTRAVENOUS; SUBCUTANEOUS
Status: DISCONTINUED | OUTPATIENT
Start: 2019-06-25 | End: 2019-06-28 | Stop reason: HOSPADM

## 2019-06-25 RX ORDER — RIFAMPIN 300 MG/1
600 CAPSULE ORAL EVERY 12 HOURS SCHEDULED
Status: DISCONTINUED | OUTPATIENT
Start: 2019-06-25 | End: 2019-06-28 | Stop reason: HOSPADM

## 2019-06-25 RX ORDER — PYRAZINAMIDE TABLET 500 MG/1
1500 TABLET ORAL DAILY
Status: DISCONTINUED | OUTPATIENT
Start: 2019-06-26 | End: 2019-06-28 | Stop reason: HOSPADM

## 2019-06-25 RX ORDER — METOCLOPRAMIDE HYDROCHLORIDE 5 MG/ML
10 INJECTION INTRAMUSCULAR; INTRAVENOUS EVERY 6 HOURS PRN
Status: DISCONTINUED | OUTPATIENT
Start: 2019-06-25 | End: 2019-06-28 | Stop reason: HOSPADM

## 2019-06-25 RX ORDER — ONDANSETRON 2 MG/ML
4 INJECTION INTRAMUSCULAR; INTRAVENOUS EVERY 6 HOURS PRN
Status: DISCONTINUED | OUTPATIENT
Start: 2019-06-25 | End: 2019-06-28 | Stop reason: HOSPADM

## 2019-06-25 RX ORDER — METOCLOPRAMIDE 5 MG/1
10 TABLET ORAL EVERY 6 HOURS PRN
Status: DISCONTINUED | OUTPATIENT
Start: 2019-06-25 | End: 2019-06-28 | Stop reason: HOSPADM

## 2019-06-25 RX ORDER — LIDOCAINE 40 MG/G
CREAM TOPICAL
Status: DISCONTINUED | OUTPATIENT
Start: 2019-06-25 | End: 2019-06-28 | Stop reason: HOSPADM

## 2019-06-25 RX ORDER — MAGNESIUM SULFATE HEPTAHYDRATE 40 MG/ML
4 INJECTION, SOLUTION INTRAVENOUS EVERY 4 HOURS PRN
Status: DISCONTINUED | OUTPATIENT
Start: 2019-06-25 | End: 2019-06-28 | Stop reason: HOSPADM

## 2019-06-25 RX ORDER — POTASSIUM CHLORIDE 1500 MG/1
20-40 TABLET, EXTENDED RELEASE ORAL
Status: DISCONTINUED | OUTPATIENT
Start: 2019-06-25 | End: 2019-06-28 | Stop reason: HOSPADM

## 2019-06-25 RX ORDER — PROCHLORPERAZINE 25 MG
25 SUPPOSITORY, RECTAL RECTAL EVERY 12 HOURS PRN
Status: DISCONTINUED | OUTPATIENT
Start: 2019-06-25 | End: 2019-06-28 | Stop reason: HOSPADM

## 2019-06-25 RX ORDER — PYRIDOXINE HCL (VITAMIN B6) 25 MG
25 TABLET ORAL DAILY
Status: DISCONTINUED | OUTPATIENT
Start: 2019-06-26 | End: 2019-06-28 | Stop reason: HOSPADM

## 2019-06-25 RX ORDER — PROCHLORPERAZINE MALEATE 10 MG
10 TABLET ORAL EVERY 6 HOURS PRN
Status: DISCONTINUED | OUTPATIENT
Start: 2019-06-25 | End: 2019-06-28 | Stop reason: HOSPADM

## 2019-06-25 RX ADMIN — RIFAMPIN 600 MG: 300 CAPSULE ORAL at 22:48

## 2019-06-25 RX ADMIN — ENOXAPARIN SODIUM 40 MG: 40 INJECTION SUBCUTANEOUS at 22:48

## 2019-06-25 ASSESSMENT — ACTIVITIES OF DAILY LIVING (ADL)
BATHING: 0-->INDEPENDENT
RETIRED_COMMUNICATION: 0-->UNDERSTANDS/COMMUNICATES WITHOUT DIFFICULTY
TRANSFERRING: 0-->INDEPENDENT
SWALLOWING: 0-->SWALLOWS FOODS/LIQUIDS WITHOUT DIFFICULTY
DRESS: 0-->INDEPENDENT
FALL_HISTORY_WITHIN_LAST_SIX_MONTHS: NO
TOILETING: 0-->INDEPENDENT
RETIRED_EATING: 0-->INDEPENDENT
AMBULATION: 0-->INDEPENDENT
COGNITION: 0 - NO COGNITION ISSUES REPORTED

## 2019-06-26 LAB
ALBUMIN SERPL-MCNC: 2.9 G/DL (ref 3.4–5)
ALP SERPL-CCNC: 89 U/L (ref 40–150)
ALT SERPL W P-5'-P-CCNC: 17 U/L (ref 0–50)
ANION GAP SERPL CALCULATED.3IONS-SCNC: 3 MMOL/L (ref 3–14)
ANNOTATION COMMENT IMP: NORMAL
AST SERPL W P-5'-P-CCNC: 22 U/L (ref 0–45)
BILIRUB SERPL-MCNC: 1 MG/DL (ref 0.2–1.3)
BUN SERPL-MCNC: 9 MG/DL (ref 7–30)
CALCIUM SERPL-MCNC: 8.3 MG/DL (ref 8.5–10.1)
CHLORIDE SERPL-SCNC: 105 MMOL/L (ref 94–109)
CO2 SERPL-SCNC: 28 MMOL/L (ref 20–32)
CREAT SERPL-MCNC: 0.62 MG/DL (ref 0.52–1.04)
DEPRECATED M TB RPOB XXX QL NAA+PROBE: NORMAL
ERYTHROCYTE [DISTWIDTH] IN BLOOD BY AUTOMATED COUNT: 14.4 % (ref 10–15)
GFR SERPL CREATININE-BSD FRML MDRD: >90 ML/MIN/{1.73_M2}
GLUCOSE SERPL-MCNC: 105 MG/DL (ref 70–99)
HCT VFR BLD AUTO: 36.3 % (ref 35–47)
HGB BLD-MCNC: 11.3 G/DL (ref 11.7–15.7)
M TB DNA SPEC QL NAA+PROBE: NORMAL
MCH RBC QN AUTO: 30.8 PG (ref 26.5–33)
MCHC RBC AUTO-ENTMCNC: 31.1 G/DL (ref 31.5–36.5)
MCV RBC AUTO: 99 FL (ref 78–100)
PLATELET # BLD AUTO: 325 10E9/L (ref 150–450)
POTASSIUM SERPL-SCNC: 3.7 MMOL/L (ref 3.4–5.3)
PROT SERPL-MCNC: 8 G/DL (ref 6.8–8.8)
RBC # BLD AUTO: 3.67 10E12/L (ref 3.8–5.2)
SODIUM SERPL-SCNC: 136 MMOL/L (ref 133–144)
SPECIMEN SOURCE: NORMAL
WBC # BLD AUTO: 7.7 10E9/L (ref 4–11)

## 2019-06-26 PROCEDURE — 25000128 H RX IP 250 OP 636: Performed by: INTERNAL MEDICINE

## 2019-06-26 PROCEDURE — 99232 SBSQ HOSP IP/OBS MODERATE 35: CPT | Performed by: INTERNAL MEDICINE

## 2019-06-26 PROCEDURE — 36415 COLL VENOUS BLD VENIPUNCTURE: CPT | Performed by: INTERNAL MEDICINE

## 2019-06-26 PROCEDURE — 80053 COMPREHEN METABOLIC PANEL: CPT | Performed by: INTERNAL MEDICINE

## 2019-06-26 PROCEDURE — 40000275 ZZH STATISTIC RCP TIME EA 10 MIN

## 2019-06-26 PROCEDURE — G0463 HOSPITAL OUTPT CLINIC VISIT: HCPCS

## 2019-06-26 PROCEDURE — 94640 AIRWAY INHALATION TREATMENT: CPT

## 2019-06-26 PROCEDURE — 85027 COMPLETE CBC AUTOMATED: CPT | Performed by: INTERNAL MEDICINE

## 2019-06-26 PROCEDURE — 25000125 ZZHC RX 250: Performed by: INTERNAL MEDICINE

## 2019-06-26 PROCEDURE — 12000000 ZZH R&B MED SURG/OB

## 2019-06-26 PROCEDURE — 25000132 ZZH RX MED GY IP 250 OP 250 PS 637: Performed by: INTERNAL MEDICINE

## 2019-06-26 PROCEDURE — 99207 ZZC CDG-MDM COMPONENT: MEETS LOW - DOWN CODED: CPT | Performed by: INTERNAL MEDICINE

## 2019-06-26 RX ORDER — SODIUM CHLORIDE FOR INHALATION 3 %
3 VIAL, NEBULIZER (ML) INHALATION
Status: DISCONTINUED | OUTPATIENT
Start: 2019-06-26 | End: 2019-06-28 | Stop reason: HOSPADM

## 2019-06-26 RX ADMIN — ENOXAPARIN SODIUM 40 MG: 40 INJECTION SUBCUTANEOUS at 21:20

## 2019-06-26 RX ADMIN — ETHAMBUTOL HYDROCHLORIDE 1200 MG: 400 TABLET, FILM COATED ORAL at 07:29

## 2019-06-26 RX ADMIN — DOLUTEGRAVIR SODIUM 50 MG: 50 TABLET, FILM COATED ORAL at 09:17

## 2019-06-26 RX ADMIN — RIFAMPIN 600 MG: 300 CAPSULE ORAL at 21:19

## 2019-06-26 RX ADMIN — Medication 25 MG: at 07:29

## 2019-06-26 RX ADMIN — RIFAMPIN 600 MG: 300 CAPSULE ORAL at 07:30

## 2019-06-26 RX ADMIN — ISONIAZID 300 MG: 300 TABLET ORAL at 07:29

## 2019-06-26 RX ADMIN — SODIUM CHLORIDE SOLN NEBU 3% 3 ML: 3 NEBU SOLN at 10:34

## 2019-06-26 RX ADMIN — EMTRICITABINE AND TENOFOVIR ALAFENAMIDE 1 TABLET: 200; 25 TABLET ORAL at 10:59

## 2019-06-26 ASSESSMENT — MIFFLIN-ST. JEOR: SCORE: 1326.77

## 2019-06-26 ASSESSMENT — ACTIVITIES OF DAILY LIVING (ADL)
ADLS_ACUITY_SCORE: 10

## 2019-06-26 NOTE — PLAN OF CARE
DX :    Tuberculosis/HIV   HX  :  Tuberculosis/ HIV  LABS : k 3.7, Na 136/ albuim 2.9/ Hgb 11.3  TELE:   None  GI/ : Continent of B&B.  DIET : Regular   ASSESS; A&OX4. LS diminished on all lung fields. Independent in room. Wound to her right side of her neck intact with dressing and dry, no drainage noted.  PAIN : Denied pain.  ACTIVITY :  up independently.   TEACHING : Use pf incentive spirometer.  PLAN FOR DISCHARGE : TDB per ID recommendation.

## 2019-06-26 NOTE — PROGRESS NOTES
Respiratory Therapy Note    Patient seen for sputum induction. Patient given 3 mL Sodium Chloride nebulizer at 1034. Patient also given sputum specimen cup. Patient told via  to try and cough sample into cup. RT to follow.    Leana Kaur  10:39 AM June 26, 2019

## 2019-06-26 NOTE — PROGRESS NOTES
Per Flor in lab  sputum got reflexed to PCR resistance that testing was complete.  Don't need a new sample.    If [facility] wants result they can call infection prevention RN.

## 2019-06-26 NOTE — PROGRESS NOTES
[facility]  WO Nurse Inpatient Wound Assessment     Assessment of wound(s) on pt's:   Right supraclavicular         Data:   Patient History:      per MD note(s): 39 year old black female with a significant past medical history of remote history of pulmonary tuberculosis, controlled HIV infection, recent admission to [facility] for tuberculous lymphadenitis and cavitary lung lesion who presents from Dr. Toro clinic.  Patient was a direct treatment from clinic after  of ID so patient and reviewed patient's recent sputum acid-fast stain which came back positive.  [facility] was concerned about patient's risk of disseminated infection at home and recommended admission to the hospital for initiation of treatments as well as discharge planning.     Current Diet / Nutrition:       Orders Placed This Encounter        Combination Diet Regular Diet Adult                  Toby Risk Assessment  Sensory Perception: 3-->slightly limited    Moisture: 4-->rarely moist   Activity: 3-->walks occasionally     Mobility: 4-->no limitation   Nutrition: 3-->adequate   Toby Score: 20    Mattress:  standard, Atmos Air mattress    Labs:     Recent Labs   Lab Test 06/26/19  0817  06/14/19  1459   ALBUMIN 2.9*  --   --    HGB 11.3*   < > 11.0*   RBC 3.67*  --  3.56*   WBC 7.7  --  7.8      < > 366   CRP  --   --  107.0*    < > = values in this interval not displayed.        Wound Assessment (location #1):   Right supraclavicular  Wound History:  Biopsy site, see above    Specific Dimensions (length x width x depth, in cm):   1 x 0.5 x unknown depth cm moist stringy yellow/ white tissue from old lung biopsy site    Periwound Skin: intact,      Color: normal and consistent with surrounding tissue    Temperature  normal     Drainage:    Amount: heavy,  Color: clear    Odor: none    Pain:  minimal , tender and when touched          Intervention:     Patient's chart  evaluated.      Wound(s) was assessed    Wound Care: was done:  Per POC    Orders  Written    Supplies  ordered: Mesalt ribbon, gathered, placed at the bedside and discussed with RN    Discussed plan of care with Patient, Nurse and ID MD          Assessment:       Biopsy site is stable, draining large amount clear fluid fairly constantly, no signs of active infection. Need to manage wound drainage using either AquaCel Ag or Mesalt ribbon to dry the wound base.          Plan:     Nursing to notify the Provider(s) and re-consult the WO Nurse if wound(s) deteriorate(s) or if the wound care plan needs reevaluation.    Plan of care for wound located on Right neck: Daily and PRN    1. Spritz with wound spray, pat dry    2. No sting DAILY to periwound for good skin care    3. Cut a small piece of Aquacel Ag and tuck over wound base    4. Cover with 2 4x4 gauze pads folded; secure with Medipore tape      WOC Nurse will return: end of week

## 2019-06-26 NOTE — PLAN OF CARE
Pt is here for TB. Right side of neck had biopsy done, site is oozing and covered with mepilex. WOC consult is placed. WOC and ID are following pt. Pt us up IND.  Americ speaking. Plan is to stay a couple more days.

## 2019-06-26 NOTE — PLAN OF CARE
Wound to right lower neck with small amount of drainage.  Wound was cleaned with wound cleanser and a foam dressing was applied.  Low grade temp of 100.1 when she first arrived on the unit.  98% on room air.  Up independently in the room with good amount of urine output.  Airborne and contact precautions in place.   phone used during assessments.  Patient verbalized understanding of the POC. ID and WOC are ordered to consult with the patient today.  Continue with POC.

## 2019-06-26 NOTE — CONSULTS
ID consult dictated IMP 1 40 yo female with now proven TB lung and neck node, admit for renewed contagious/resistance concern    REC 4 drug tx, sample to [facility] for PCR, [facility] sample to [facility],

## 2019-06-26 NOTE — PROGRESS NOTES
[facility]    Hospitalist Progress Note  Provider : Trupti Sorto MD  Date of Service (when I saw the patient): 06/26/2019    Assessment & Plan     Brief summary of admission assessment:Krystina Kinney is a 39 year old black female with a significant past medical history of remote history of pulmonary tuberculosis, controlled HIV infection, recent admission to [facility] for tuberculous lymphadenitis and cavitary lung lesion who presents from Dr. Toro clinic. Patient was a direct treatment from clinic after  of ID so patient and reviewed patient's recent sputum acid-fast stain which came back positive.  [facility]  recommended admission to the hospital for initiation of treatments as well as discharge planning.     Admission diagnoses:      1.  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  Patient is relatively asymptomatic without any significant cough or sputum production. Her only symptom she has suggestive of tuberculosis is night sweats. Patient denies fever, loss of appetite or weight loss.  --Sputum positive for AFB stain. Quantiferon positive  --Started on ethambutol, INH, pyrazinamide, Rifampin, vitamin B6 per ID. Infectious disease following     2.  HIV infection: Controlled on medication. Continue her antiretroviral medications per ID     4.  Social: Patient is not sharing a room at home with anyone but she lives with another person who lives in a separate room in a family home.       Code Status: Full Code    Disposition: Expected discharge TBD per ID recommendations    Trupti Sorto MD    Interval History   Patient seen and examined. She stated that he is feeling better.     -Data reviewed today: I reviewed all new labs and imaging results over the last 24 hours. I personally reviewed     Physical Exam   Temp: 97.8  F (36.6  C) Temp src: Oral BP: 94/56 Pulse: 68   Resp: 18 SpO2: 95 % O2  Device: None (Room air)    Vitals:    06/26/19 0425   Weight: 69.3 kg (152 lb 11.2 oz)     Vital Signs with Ranges  Temp:  [97.3  F (36.3  C)-100.1  F (37.8  C)] 97.8  F (36.6  C)  Pulse:  [68-94] 68  Resp:  [16-20] 18  BP: ()/(56-78) 94/56  SpO2:  [95 %-98 %] 95 %  I/O last 3 completed shifts:  In: -   Out: 550 [Urine:550]    GEN:  Alert, oriented x 3, appears comfortable, NAD.  HEENT:  Normocephalic/atraumatic, no scleral icterus, no nasal discharge, mouth moist.  CV:  Regular rate and rhythm, no murmur or JVD.  S1 + S2 noted, no S3 or S4.  LUNGS:  Clear to auscultation bilaterally without rales/rhonchi/wheezing/retractions.  Symmetric chest rise on inhalation noted.  ABD:  Active bowel sounds, soft, non-tender/non-distended.  No rebound/guarding/rigidity.  EXT:  No edema or cyanosis.  Hands/feet warm to touch with good signs of peripheral perfusion.  No joint synovitis noted.  SKIN:  Dry to touch, no exanthems noted in the visualized areas.  NEURO:  Symmetric muscle strength, sensation to touch grossly intact.  No new focal deficits appreciated.    Medications       dolutegravir  50 mg Oral Daily     emtricitabine-tenofovir AF  1 tablet Oral Daily     enoxaparin  40 mg Subcutaneous Q24H     ethambutol  1,200 mg Oral Daily     isoniazid  300 mg Oral Daily     pyrazinamide  1,500 mg Oral Daily     rifampin  600 mg Oral Q12H ALYSIA     sodium chloride (PF)  3 mL Intracatheter Q8H     vitamin B6  25 mg Oral Daily       Data   Recent Labs   Lab 06/26/19  0817 06/25/19  2241   WBC 7.7  --    HGB 11.3*  --    MCV 99  --     389     --    POTASSIUM 3.7  --    CHLORIDE 105  --    CO2 28  --    BUN 9  --    CR 0.62 0.63   ANIONGAP 3  --    RIMA 8.3*  --    *  --    ALBUMIN 2.9*  --    PROTTOTAL 8.0  --    BILITOTAL 1.0  --    ALKPHOS 89  --    ALT 17  --    AST 22  --        No results found for this or any previous visit (from the past 24 hour(s)).

## 2019-06-26 NOTE — H&P
Hospitalist Admission Note([facility])    Name: Krystina Kinney    MRN: [id number]          YOB: 1980    Age: 39 year old  Date of admission: 6/25/2019  Primary care provider: Johnny Vinson              Assessment:       Brief summary of admission assessment:Krystina Kinney is a 39 year old black female with a significant past medical history of remote history of pulmonary tuberculosis, controlled HIV infection, recent admission to [facility] for tuberculous lymphadenitis and cavitary lung lesion who presents from Dr. Toro clinic.  Patient was a direct treatment from clinic after  of ID so patient and reviewed patient's recent sputum acid-fast stain which came back positive.  [facility] was concerned about patient's risk of disseminated infection at home and recommended admission to the hospital for initiation of treatments as well as discharge planning.    Admission diagnoses:      #1.  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  Patient is relatively asymptomatic without any significant cough or sputum production.  Her only symptom she has suggestive of tuberculosis is night sweats.  Patient denies fever, loss of appetite or weight loss.    #2.  HIV infection: Controlled on medication    #3.  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  No evidence of malignancy or  positive acid-fast bacillus stain.  This diagnosis was empirical when she visited [facility] in April and stayed there until June and was started on for antituberculous medications she has been taking until she came back to the [facility] and was advised to quit taking it.    #4.  Right supraclavicular swelling with drainage: Suspect infected biopsy site versus tuberculosis related.    #5.  Social: Patient is not sharing a room at home with anyone but she lives with another person who lives in a separate room in  a family home.             Plan/MDM:       > Admission Status: Will admit patient to hospitalist service as inpatient as patient likely need over two mid night stays in the hospital.     >Care plan:    --Admission to medical bed, initiation of 4 drug regimen for tuberculosis including INH, pyrazinamide, ethambutol, rifampin as well as pyridoxine supplementation.  This regimen was discussed with infectious disease physician.  --I obtained Gram stain and culture from the wound in the right supraclavicular area.  I will add acid-fast stain as well.  --Wound care consultation obtained and consider surgical consultation if the wound is deep and needs debridement.  --Infectious disease consultation to evaluate and assist with further care plan.  I do not think she needs to be in the hospital for a long time for treatment of tuberculosis as she does not share room with another person, not having productive cough and barely symptomatic.  --Continue home medications.      >Supportive care:Pain management: acetominophen and anxiolytics  Nausea and vomiting control measures    >Diet:Diet advanced    >Activity:Advance activity as tolerated    >Education/Counseling :Discussed treatment plan with the patient    >Consults:Inpatient consult with infectious disease    >VTE prophylactic measures:prophylaxis against venous thromboembolism    >Therapies:Wound care      >Additional orders:    --Care plan discussed with the patient/family and agreed to care plan   --Patient will be transferred to care of hospitalist attending for further evaluation and management as appropriate   --Old medical orders reviewed   --imaging result independently reviewed by me     (See orders placed for this visit by me )     - Home medication reviewed and will be continued as appropriate once pharmacy reconciliation is completed         Code Status/Disposition:     >Code Status:Full Code      >Disposition:anticipate discharge to home and Anticipate  discharge in 3 days        Disclaimer: This note consists of symbols derived from keyboarding, dictation and/or voice recognition software. As a result, there may be errors in the script that have gone undetected. Please consider this when interpreting information found in this chart.             Chief Complaint:     Sent from clinic due to concern for disseminated tuberculosis     History is obtained from the patient          History of Present Illness:      This patient is a 39 year old  female with a significant past medical history of controlled HIV infection, tuberculous lymphadenitis who presents with the following condition requiring a hospital admission:      Disseminated tuberculosis    Patient is a 39-year-old female who has past medical history significant for HIV infection which is pretty much controlled with medications, history of premature closure mostly and recent admission to [facility] for cavitary lung lesion and lymphadenitis.  Patient was released from [facility] few days ago on June 21, 2019 after she stayed there for 7 days.  She was instructed to follow-up with infectious disposition and follow-up of her sputum cultures and acid-fast stain.  Patient was seen by Dr. Toro in the clinic today and he discussed with Department of Health regarding the patient's need to be admitted for initiation of treatment in a controlled environment.  Patient's symptoms has been limited to some night sweats otherwise denies any cough, shortness of breath, fever or chest pain.  Of note she recently visited [facility] between April and June when she was diagnosed with scrofula and initiated with anti-tuberculosis medications which she has been taking for about a month and and instructed to stop after she got back to the [place].             Past Medical History:     Past Medical History:   Diagnosis Date     Chronic midline low back pain with left-sided sciatica 5/11/2017      Human immunodeficiency virus (HIV) disease (H) 5/11/2017     Pulmonary tuberculosis 2004    diagnosed and treated in [place]- treatment x 2 months- unknown drug(s) and other details            Past Surgical History:     Past Surgical History:   Procedure Laterality Date     LYMPH NODE BIOPSY Right 2011    Right cervical- Dx:Chronic suppurative lymphadenitis suggestive of tuberculosis- in [place]             Social History:     Social History     Tobacco Use     Smoking status: Never Smoker     Smokeless tobacco: Never Used   Substance Use Topics     Alcohol use: Yes             Family History:     Family History   Problem Relation Age of Onset     Cancer No family hx of             Allergies:   No Known Allergies          Medications:        Prior to Admission medications    Medication Sig Last Dose Taking? Auth Provider   DESCOVY 200-25 MG per tablet Take 1 tablet by mouth daily   Unknown, Entered By History   NONFORMULARY Drug is from eric.   Contains Rifampin 150mg, INH 75mg, Ethambutol 275mg, pyrazinamide 400mg  Patient is taking total of 4 pills per day.    Has a 1 week supply with her  SIG : 2 tablets bid.   Unknown, Entered By History   TIVICAY 50 MG tablet Take 50 mg by mouth daily   Unknown, Entered By History          Review of Systems:     A Comprehensive greater than 10 system review of systems was carried out.  Pertinent positives and negatives are noted above in HPI.  Otherwise negative for contributory information.           Physical Exam:     Vital signs were reviewed    Temp:  [100.1  F (37.8  C)] 100.1  F (37.8  C)  Pulse:  [94] 94  Resp:  [20] 20  BP: (121)/(78) 121/78  SpO2:  [96 %] 96 %        GEN: awake, alert, cooperative, no apparent distress, oriented x 3    NECK:Supple , she has draining wound on the right supraclavicular area with area of incision and wound draining purulent material.    HEENT:  Normocephalic/atraumatic, no scleral icterus, no nasal discharge, mouth moist.    CV:   Regular rate and rhythm, no murmur or JVD.  S1 + S2 noted, no S3 or S4.    LUNGS:  Clear to auscultation bilaterally without rales/rhonchi/wheezing/retractions.  Symmetric chest rise on inhalation noted.    ABD:  Active bowel sounds, soft, non-tender/non-distended.  No rebound/guarding/rigidity.    EXT: Trace edema lower extremities    LGS: No cervical or axillary lymphadenopathy     SKIN:  Dry to touch, warm ,no exanthems noted in the visualized areas.    Neurologic:Grossly intact,non focal . No acute focal neurologic deficit     Psychaitric exam: Mood and affect normal     Additional significant  Findings:               Data:       All laboratory and imaging data in the past 24 hours reviewed     Results for orders placed or performed during the hospital encounter of 06/14/19   Soft tissue neck CT w contrast    Narrative    CT SCAN OF THE NECK WITH CONTRAST  6/14/2019 5:04 PM     HISTORY: Lower right supraclavicular/anterior cervical swelling,  evaluate abscess, lymphadenitis. History of tuberculosis, original  biopsy of nodes from 2011 showed chronic suppurative lymphadenitis  suggestive of tuberculosis. Patient being treated with Pyrazinamide.    TECHNIQUE: Axial images and coronal reformations. Radiation dose for  this scan was reduced using automated exposure control, adjustment of  the mA and/or kV according to patient size, or iterative  reconstruction technique. 90mL Isovue-370 IV.    COMPARISON: None.    FINDINGS: There is an irregularly-shaped mass in the lower anterior  right neck with irregular peripheral enhancement around low  attenuation center with no calcification. The mass is 4.9 x 5.0 x 5.3  cm diameter and is in the level V lymph node chain. More posteriorly  and inferiorly, there are additional lesions consistent with necrotic  nodes in the supraclavicular fossa, and another cluster similar nodes  are seen high in the right axilla. There is no adenopathy in the left  neck.    The orbits,  sinuses, parotid glands, submandibular glands and thyroid  gland appear normal. The tongue and airway appear normal. No bone  lesions are seen.    Scans through the upper lungs show a partially cavitary mass or  infiltrate in the left upper lobe centrally measuring 4.5 x 2.6 x 2.6  cm diameter. In addition, there are multiple air cysts bilaterally  throughout both lungs. Most of these are thin-walled, but a few have  slightly thickened walls.      Impression    IMPRESSION:  1. Confluent necrotic lymph nodes in the lower right neck and right  axilla consistent with scrofula.  2. Partially necrotic left upper lobe mass. Differential diagnosis  includes tuberculosis and lung cancer.  3. Multiple air cysts in the lungs. Differential diagnosis is broad  and includes emphysema, alpha-1 antitrypsin deficiency and  lymphangiomyomatosis.    I called the report to Dr. Cecil Mullins in the emergency room at 5:40  PM on 6/14/2019.    DONNA BELLE MD   Chest CT w/o contrast    Narrative    CT CHEST WITHOUT CONTRAST   6/14/2019 6:50 PM     HISTORY: Further evaluation of lung lesions    TECHNIQUE: No IV contrast material. Radiation dose for this scan was  reduced using automated exposure control, adjustment of the mA and/or  kV according to patient size, or iterative reconstruction technique.    COMPARISON: CT neck 6/14/2019    FINDINGS: Noncontrast images of the chest less well demonstrate right  supraclavicular adenopathy. There are multiple benign-appearing,  thin-walled cystic lesions scattered throughout the lungs. There is  airspace opacity in the anterolateral left upper lobe with some  cavitation.    There is a left upper lobe nodule that measures 6 mm (series 4, image  131). There is another nodule in the right lower lobe that measures 6  mm (series 4, image 152).    No pleural or pericardial effusion. No mediastinal or hilar  adenopathy. There are mildly enlarged left periaortic lymph nodes, one  of which at the  inferior margin of the study measures 2.4 x 1.4 cm  (series 2, image 60).      Impression    IMPRESSION:  1. Cavitary airspace opacity in the left upper lobe is likely  infectious and active tuberculosis is a consideration. This is  associated with necrotic supraclavicular adenopathy on the right may  represent scrofula.  2. Cystic lung disease with thin-walled, benign-appearing cysts. This  may be related Langerhans cell histiocytosis or  lymphangioleiomyomatosis. Emphysema considered less likely.  3. Incidental 6 mm pulmonary nodules. Consider follow-up CT in 6-12  months.  4. Probable retroperitoneal adenopathy is incompletely evaluated on  this study.    NICK ARRIAZA MD   XR Chest 2 Views    Narrative    CHEST TWO VIEWS  6/16/2019 9:12 AM     HISTORY: Tuberculosis.    COMPARISON: CT neck and chest 6/14/2019      Impression    IMPRESSION: Left upper lobe masslike infiltrate is unchanged. Right  lower neck and axillary soft tissue swelling is unchanged. No pleural  effusion, no other infiltrates.    DONNA BELLE MD   US Biopsy Lymph Node Core    Narrative    Procedure(s):  Ultrasound-guided lymph node biopsy    Date of Procedure: 6/18/2019    Operators:    Samuel Cabello MD    Medications:  1% Lidocaine SQ    Contrast: None    Referenced air kerma: 0 mGy  Fluoroscopy time: 0 minutes    Estimated blood loss: Minimal    Complications: None    Pre-procedure diagnosis: Tuberculosis  Post-procedure diagnosis: Same    Clinical History/Indication: 39-year-old female with active  tuberculosis in large necrotic lymph node in the right supraclavicular  region.    Procedure and Findings:  Following a discussion of the risks, benefits, indications, and  alternatives to treatment, appropriate informed consent was obtained  from the patient.  The patient was seen at bedside and placed supine  on the table. The right supraclavicular region was prepped and draped  in the usual sterile fashion.  A timeout was performed per  [facility]  universal protocol policy to confirm the correct patient, site and  procedure to be performed.    Preliminary ultrasound of the right neck was performed to assess for  appropriate sites for access.  These images identify large necrotic  lymph node and an ultrasound image was archived. An appropriate site  for skin access was identified and the overlying soft tissues were  infiltrated with 1% Lidocaine for local anesthesia. Under direct  ultrasound visualization an 17-gauge trocar needle was advanced into  the target lesion. Approximately 20 mL of purulent fluid was aspirated  through the trocar. Then, through the trocar needle, an 18-gauge Bard  spring loaded biopsy was used to obtain 4 core biopsy specimen. The  biopsy samples were visually inspected and when they were found to be  adequate they were submitted to pathology. All needles were removed  and hemostasis was achieved with manual compression. A sterile  dressing was applied.     Throughout the procedure, the patient was monitored by a radiology  nurse for cardiac rhythm, blood pressure and oxygen saturation which  remained stable. The patient tolerated the procedure well and left  interventional radiology in stable condition.      Impression    Impression:  Ultrasound-guided targeted lymph node biopsy, as described    CHRIS CHRIS RODRIGUEZ MD   CBC with platelets differential   Result Value Ref Range    WBC 7.8 4.0 - 11.0 10e9/L    RBC Count 3.56 (L) 3.8 - 5.2 10e12/L    Hemoglobin 11.0 (L) 11.7 - 15.7 g/dL    Hematocrit 33.9 (L) 35.0 - 47.0 %    MCV 95 78 - 100 fl    MCH 30.9 26.5 - 33.0 pg    MCHC 32.4 31.5 - 36.5 g/dL    RDW 14.5 10.0 - 15.0 %    Platelet Count 366 150 - 450 10e9/L    Diff Method Automated Method     % Neutrophils 68.8 %    % Lymphocytes 17.2 %    % Monocytes 11.1 %    % Eosinophils 2.1 %    % Basophils 0.3 %    % Immature Granulocytes 0.5 %    Nucleated RBCs 0 0 /100    Absolute Neutrophil 5.4 1.6 - 8.3 10e9/L    Absolute  Lymphocytes 1.3 0.8 - 5.3 10e9/L    Absolute Monocytes 0.9 0.0 - 1.3 10e9/L    Absolute Eosinophils 0.2 0.0 - 0.7 10e9/L    Absolute Basophils 0.0 0.0 - 0.2 10e9/L    Abs Immature Granulocytes 0.0 0 - 0.4 10e9/L    Absolute Nucleated RBC 0.0    Basic metabolic panel   Result Value Ref Range    Sodium 137 133 - 144 mmol/L    Potassium 3.8 3.4 - 5.3 mmol/L    Chloride 104 94 - 109 mmol/L    Carbon Dioxide 32 20 - 32 mmol/L    Anion Gap 1 (L) 3 - 14 mmol/L    Glucose 97 70 - 99 mg/dL    Urea Nitrogen 10 7 - 30 mg/dL    Creatinine 0.59 0.52 - 1.04 mg/dL    GFR Estimate >90 >60 mL/min/[1.73_m2]    GFR Estimate If Black >90 >60 mL/min/[1.73_m2]    Calcium 9.1 8.5 - 10.1 mg/dL   CRP inflammation   Result Value Ref Range    CRP Inflammation 107.0 (H) 0.0 - 8.0 mg/L   Magnesium   Result Value Ref Range    Magnesium 2.2 1.6 - 2.3 mg/dL   T-cell subset profile   Result Value Ref Range    IFC Specimen Blood     CD3 Mature T 91 (H) 49 - 84 %    CD4 Steele T 31 28 - 63 %    CD8 Suppressor T 59 (H) 10 - 40 %    CD4:CD8 Ratio 0.53 (L) 1.40 - 2.60    Absolute CD3 882 603 - 2,990 cells/uL    Absolute CD4 306 (L) 441 - 2,156 cells/uL    Absolute CD8 571 125 - 1,312 cells/uL   HIV-1 RNA quantitative   Result Value Ref Range    HIV-1 RNA Quant Result HIV-1 RNA Not Detected HIVND^HIV-1 RNA Not Detected [Copies]/mL    HIV RNA QT Log Not Calculated <1.3 [Log_copies]/mL   Hemoglobin   Result Value Ref Range    Hemoglobin 10.9 (L) 11.7 - 15.7 g/dL   Surgical pathology exam   Result Value Ref Range    Copath Report       Patient Name: NIKKI ALVA  MR#: [id number]  Specimen #: [id number]  Collected: 6/18/2019  Received: 6/18/2019  Reported: 6/20/2019 11:23  Ordering Phy(s): KATI VELIZ    For improved result formatting, select 'View Enhanced Report Format' under   Linked Documents section.    SPECIMEN(S):  Right neck mass    FINAL DIAGNOSIS:  Soft tissue/lymph node, right neck, mass, biopsy (not otherwise   specified):  1. Negative  "for malignancy.  2. Non-necrotizing granulomatous inflammation.  3. AFB and GMS stains negative for acid fast and fungal forms,   respectively.  4. Cannot exclude underlying infectious etiology; recommend correlation   with microbiology studies.    Electronically signed out by:    Flo Irvin M.D.    GROSS:  The specimen is received in saline, labeled with the patient's name and   date of birth, and designated \"right  neck mass biopsy\". It consists of three red white needle cores ranging   from 0.2-0.6 cm in length.  Wrapped and  entirely submitted in one marco antonio sette. (Dictated by: JEANNE Miner([facility])   6/18/2019 02:35 PM)    MICROSCOPIC:  Noted is the reported history (per available electronic medical record)   including HIV-positive on  antiretroviral therapy, treatment for tuberculosis, and prior (2011) right   cervical lymph node biopsy  consistent with tuberculous lymphadenitis (outside pathology material and   full report not available for  review).    Sections demonstrate fragments of lymphoid and connective tissue involved   by non-necrotizing granulomatous  inflammation.  There is no malignancy on H&E.  For interpretative support,   stains for AFB and GMS are  performed with adequate controls.  AFB is negative for acid fast forms.    GMS is negative for fungal forms.    This case was reviewed within the department by multiple other members who   concur with the diagnosis.    The technical component of this testing was completed at the [facility], with the professional compo nent performed   at the [facility], [address] [place] [address]  [phone] ([phone])    CPT Codes:  A: 09319-XO2, 45110-FAE, 29942-MND    COLLECTION SITE:  Client: [facility]  Location: [facility] (S)       Surgery General IP Consult: Patient to be seen: ASAP - within 4 hours; Call back #: [phone] Unit; Please eval. re. " Right scapular/upper chest swelling consistent with scrofula; see ID note re. need for Bx and/or I and D (swelling is tight and causinng ...    Narrative    Brown, Chaka Mayer MD     6/15/2019  2:00 PM  Surgery  Request made for open cervical lymph node biopsy.  Patient has a   history of HIV with latent tuberculosis.  Was recently in [place]   and was thought to have developed clinically active tuberculosis   with scrofula.  Was treated empirically and now returns to the   US.  In looking at her imaging, she has a largely necrotic   cluster of lymph nodes in the right supraclavicular fossa.  This   directly abuts the subclavian vessels in this area.  Given the   fact that this patient has been treated empirically for some   time, I do not think the risk of surgical biopsy is warranted.  I   have ordered a fine-needle aspiration of this area, as I feel   this carries much less risk of exposing hospital staff to active   tuberculosis.  This may be somewhat low yield, but I feel it is   considerably safer.  This can probably wait until Monday, as the   patient has apparently been in this clinical situation for weeks.  Avel Dasilva MD  General Surgery, Office [phone]   Quantiferon TB Gold Plus   Result Value Ref Range    Quantiferon-TB Gold Plus Result Positive (A) NEG^Negative    TB1 Ag minus Nil Value 3.87 IU/mL    TB2 Ag minus Nil Value 4.08 IU/mL    Mitogen minus Nil Result 2.71 IU/mL    Nil Result 0.34 IU/mL   AFB Stain Non-Blood ([id number])   Result Value Ref Range    Specimen Description Sputum     AFB Stain Negative for acid fast bacteria     AFB Stain       Less than 5ml of specimen received.  A minimum of 5 mL of sputum or fluid is recommended for recovery of acid fast bacilli   (AFB).  Volumes less than 5 mL are suboptimal and may compromise recovery of AFB from   culture.      AFB Stain       Assayed at [facility] [address] [place] [id number] [phone]   AFB Stain Non-Blood ([id number])    Result Value Ref Range    Specimen Description Sputum     AFB Stain Negative for acid fast bacteria     AFB Stain       Less than 5ml of specimen received.  A minimum of 5 mL of sputum or fluid is recommended for recovery of acid fast bacilli   (AFB).  Volumes less than 5 mL are suboptimal and may compromise recovery of AFB from   culture.      AFB Stain       Assayed at [facility] [employer] [address] [place] [address] [phone]   AFB Culture Non Blood   Result Value Ref Range    Specimen Description Sputum     Culture Micro       Culture received and in progress.  Positive AFB results are called as soon as detected.    Final report to follow in 7 to 8 weeks.     Fluid Culture Aerobic Bacterial   Result Value Ref Range    Specimen Description Neck Abscess Fluid     Special Requests Specimen collected in eSwab transport (white cap)     Culture Micro (A)      Light growth  Coagulase negative Staphylococcus  Susceptibility testing not routinely done      Culture Micro (A)      These bacteria are part of normal skin luis, but on occasion, may be true pathogens.    Clinical correlation must be applied to interpreting this microbiology result.     Gram stain   Result Value Ref Range    Specimen Description Sputum     Special Requests Screen     Gram Stain <10 Squamous epithelial cells/low power field     Gram Stain <25 PMNs/low power field     Gram Stain Moderate  Mixed gram negative and positive luis      AFB Culture Non Blood   Result Value Ref Range    Specimen Description Sputum     Culture Micro       Culture received and in progress.  Positive AFB results are called as soon as detected.    Final report to follow in 7 to 8 weeks.     Sputum Culture Aerobic Bacterial   Result Value Ref Range    Specimen Description Sputum     Culture Micro Heavy growth  Normal luis      AFB Culture Non Blood   Result Value Ref Range    Specimen Description Sputum     Culture Micro       Culture received and in progress.   Positive AFB results are called as soon as detected.    Final report to follow in 7 to 8 weeks.      Culture Micro       Assayed at [employer] [address] [place] [address] [phone]   AFB Stain Non Blood   Result Value Ref Range    Specimen Description Sputum     AFB Stain Negative for acid fast bacteria     AFB Stain       Assayed at [employer] [address] [place] [address] [phone]   AFB Culture Non Blood   Result Value Ref Range    Specimen Description Right Neck Mass     Culture Micro       Culture received and in progress.  Positive AFB results are called as soon as detected.    Final report to follow in 7 to 8 weeks.      Culture Micro       Assayed at [facility] [address] [place] [address] [phone]   AFB Stain Non Blood   Result Value Ref Range    Specimen Description Right Neck Mass     AFB Stain 3+  Acid fast bacilli present   (A)     AFB Stain       Critical Value/Significant Value called to and read back by  ARCHIE MOSLEY ON 66 6/20/19 1720 CK      AFB Stain       Assayed at [facility] [address] [place] [address] [phone]   AFB Culture Non Blood   Result Value Ref Range    Specimen Description Right Neck Mass CORE NEEDLE     Culture Micro       Culture received and in progress.  Positive AFB results are called as soon as detected.    Final report to follow in 7 to 8 weeks.      Culture Micro       Assayed at [facility] [address] [place] [address] [phone]   AFB Stain Non Blood   Result Value Ref Range    Specimen Description Right Neck Mass CORE NEEDLE     AFB Stain       Quantity of specimen not sufficient for Acid Fast Stain  Stain not performed.      AFB Stain       Assayed at [facility] [address] [place] [address] [phone]   AFB Culture Non Blood   Result Value Ref Range    Specimen Description Sputum     Culture Micro       Culture received and in progress.  Positive AFB results  are called as soon as detected.    Final report to follow in 7 to 8 weeks.      Culture Micro       Report finalized too soon.  Additonal final report to follow.   AFB Stain Non Blood   Result Value Ref Range    Specimen Description Sputum     AFB Stain 1+  Acid fast bacilli seen   (A)     AFB Stain       Assayed at [facility] [address] [place] [address] [phone]    AFB Stain       Critical result, provider not notified due to previous critical result notification.    AFB Stain (A)      See below for Mycobacterium tuberculosis complex detection and Rifampin resistance by PCR   reflex test.      AFB Stain Mycobacterium tuberculosis by PCR, Detected* (A)     AFB Stain MTB Rifampin by PCR, Not Detected (A)     AFB Stain (A)      MTB Complex Interpretation,  *MTBC detected. No rpoB gene mutations detected, probably   rifampin susceptible.            Patient`s old medical records reviewed and case discussed with ID physician.

## 2019-06-26 NOTE — PHARMACY-ADMISSION MEDICATION HISTORY
Admission medication history interview status for this patient is complete. See [facility] admission navigator for allergy information, prior to admission medications and immunization status.     Medication history interview source(s):Patient  Medication history resources (including written lists, pill bottles, clinic record):[facility] list  Primary pharmacy:[facility]    Changes made to [facility] medication list:  Added: None  Deleted: Nonformulary med  Changed: None    Actions taken by pharmacist (provider contacted, etc):None     Additional medication history information:None    Medication reconciliation/reorder completed by provider prior to medication history? No    Do you take OTC medications (eg tylenol, ibuprofen, fish oil, eye/ear drops, etc)? N(Y/N)    For patients on insulin therapy: N (Y/N)      Prior to Admission medications    Medication Sig Last Dose Taking? Auth Provider   DESCOVY 200-25 MG per tablet Take 1 tablet by mouth daily 6/25/2019 at Unknown time Yes Unknown, Entered By History   TIVICAY 50 MG tablet Take 50 mg by mouth daily 6/25/2019 at Unknown time Yes Unknown, Entered By History

## 2019-06-26 NOTE — CONSULTS
Consult Date:  06/26/2019      INFECTIOUS DISEASE CONSULTATION      REQUESTING PROVIDER:  Dr. Sorto.      IMPRESSION:   1.  A 39-year-old [place] female, very well known to me, active pulmonary and neck lymph node tuberculosis, now admitted for public health reasons with concern for contagiousness.  Very low likelihood of actively contagious currently, as below.   2.  Recent [place] trip for 2 months, developed lymph node swelling in her right neck while there, was diagnosed as probable scrofula and started on 4-drug antituberculous treatment, which she has been taking for nearly 4 weeks.  That area continues to be about the same, we now did a needle biopsy of this which showed granulomas confirming presumed TB.   3.  Incidental finding of cavitary lung lesion with no pulmonary symptoms including no active cough currently, sputums at [facility] were initially AFB negative, but after discharge, the last smear came back 1+ positive and that PCR was also positive proving tuberculosis with no rifampin resistance, but only that resistance code on being done.  Actual cultures are pending.   4.  HIV infection, long-term, on active antiretroviral therapy and compliant with undetectable virus and T cells in the 300+ range.      RECOMMENDATIONS:   1.  The underlying diagnosis has been proven at this point.  Now the concern is for possible resistance, given previous pulmonary tuberculosis 19 years ago, rifampin at least shown sensitive now tried to get samples for direct testing for resistance in case we do not grow the organism long-term.  At this point, no need to put it off starting drug, so started 4-drug antituberculous regimen.   2.  Her actual contagiousness level should be very low, assuming she really did take the 4 weeks of prior treatment, is not coughing, is not ill.  However, with the 1 smear positive and little children in the house she is at, it has been decided that for safety reasons, she be admitted until  we straighten out of the situation.  Exact criteria that will allow discharge not clear.   3.  TB isolation while here.   4.  Continue HIV treatment.      HISTORY:  This 39-year-old female is very well known to me; see my recent notes.  The patient has been my Clinic HIV patient for several years previously, not very compliant, but more recently fully compliant with undetectable virus and T cells generally in the 300+ range.  She has continued this treatment even now, she went to [facility].  She had a recent trip back to her home country, was there for just over 2 months.  About 4 weeks into the trip, she developed swelling in her right neck.  This was painful and uncomfortable.  She had no fever, chills, sweats, weight loss or respiratory symptoms.  She was seen, and without any diagnostic testing, was empirically started on 4-drug antituberculous therapy.  With this, she had no particular improvement during the 4 weeks, so as soon as she got back home, she came into the ER.  At that point, she was admitted and further testing ensued.  Imaging immediately showed a cavitary upper lobe lesion that had not been previously seen in the other lung area.  The patient was not coughing.  Multiple sputums were obtained.  We got reasonable samples, even though she was not coughing with induction, and the first several samples were negative smear wise with PCR pending.  The very last specimen ended up being 1+ positive and has the positive PCR.  At the day of discharge, with the data we had, I talked with Anuel PAREDES and they thought going home without isolation was okay, given no clinical symptoms and negative smears and 4 weeks of treatment.  It has now been decided that there is a risk because of the children at home, and the 1+ positive smear.  The patient continues to feel quite well.  She is still not having any cough.  The neck area is still painful and about the same as previously, although if anything, it is slightly  better despite not being on treatment most recently.      PAST MEDICAL HISTORY:  Prior HIV, well controlled recently as above.  Prior tuberculosis 19 years ago in [place], fully treated, although no record available as to that actual diagnosis.      SOCIAL AND FAMILY HISTORY:  [place] immigrant, goes back fairly frequently home, so conceivable risk for reinfection with TB as well.      MEDICATIONS:  As listed.      REVIEW OF SYSTEMS:  Largely as above.  No ongoing cough.  No fevers, chills, sweats or other systemic symptoms and no weight loss.      PHYSICAL EXAMINATION:   GENERAL:  The patient appears her usual self, does not look toxic or ill.   VITAL SIGNS:  Currently normal.   HEENT:  No intraoral lesions.  No facial skin rashes.   NECK:  Right neck area, draining area looks similar to previously, actually looks slightly better than last admission, including less tender.   HEART:  Regular rhythm, no murmur or gallop.   LUNGS:  Clear bilaterally, good air movement and O2 sats normal.   ABDOMEN:  Soft and nontender.   EXTREMITIES:  No rashes, skin lesions or joint abnormalities of any note.      LABORATORY DATA:  PCR positive on the sputum sample, the last of which had 1+ on AFB smear itself also.  Her pathology was as described.      Thank you very much for consultation.  I will follow the patient with you.         ZARI LUNA MD             D: 2019   T: 2019   MT: BETO      Name:     NIKKI ALVA   MRN:      [id number]        Account:       [id number]   :      1980           Consult Date:  2019      Document: [id number]       cc: Johnny Vinson MD

## 2019-06-27 LAB
ACID FAST STN SPEC QL: ABNORMAL
SPECIMEN SOURCE: ABNORMAL

## 2019-06-27 PROCEDURE — 12000000 ZZH R&B MED SURG/OB

## 2019-06-27 PROCEDURE — 25000128 H RX IP 250 OP 636: Performed by: INTERNAL MEDICINE

## 2019-06-27 PROCEDURE — 99232 SBSQ HOSP IP/OBS MODERATE 35: CPT | Performed by: INTERNAL MEDICINE

## 2019-06-27 PROCEDURE — 25000132 ZZH RX MED GY IP 250 OP 250 PS 637: Performed by: INTERNAL MEDICINE

## 2019-06-27 RX ADMIN — PYRAZINAMIDE 1500 MG: 500 TABLET ORAL at 08:44

## 2019-06-27 RX ADMIN — ENOXAPARIN SODIUM 40 MG: 40 INJECTION SUBCUTANEOUS at 21:16

## 2019-06-27 RX ADMIN — EMTRICITABINE AND TENOFOVIR ALAFENAMIDE 1 TABLET: 200; 25 TABLET ORAL at 08:44

## 2019-06-27 RX ADMIN — RIFAMPIN 600 MG: 300 CAPSULE ORAL at 21:16

## 2019-06-27 RX ADMIN — RIFAMPIN 600 MG: 300 CAPSULE ORAL at 08:45

## 2019-06-27 RX ADMIN — ISONIAZID 300 MG: 300 TABLET ORAL at 08:45

## 2019-06-27 RX ADMIN — ETHAMBUTOL HYDROCHLORIDE 1200 MG: 400 TABLET, FILM COATED ORAL at 08:43

## 2019-06-27 RX ADMIN — DOLUTEGRAVIR SODIUM 50 MG: 50 TABLET, FILM COATED ORAL at 08:43

## 2019-06-27 RX ADMIN — Medication 25 MG: at 08:43

## 2019-06-27 ASSESSMENT — ACTIVITIES OF DAILY LIVING (ADL)
ADLS_ACUITY_SCORE: 10

## 2019-06-27 ASSESSMENT — MIFFLIN-ST. JEOR: SCORE: 1319.06

## 2019-06-27 NOTE — PLAN OF CARE
Pt is on Contact and airborne precautions.  TB pos and controlled HIV,  Pt is Independent and denies pain.  VSS Temp 99.4 lung sounds dim.  Pt has wound on her right side of  neck WOC changes dressings  daily and if saturated needs PRN changes.  WOC and ID following.  Discharge TBD pending ID recommendation.  Continue plan of care and monitor.

## 2019-06-27 NOTE — PLAN OF CARE
A&Ox4, VSS. Denies pain.  LS diminished/clear.  O2 sats 100% on RA.  Showered.  Right neck wound cares done after shower, new dressing applied.  Up independently in room.  Calls appropriately for assist.  ID following.  Plan to continue with medication regimen, discharge per ID recommendations.  Will continue to monitor.

## 2019-06-27 NOTE — PROGRESS NOTES
[facility]    Hospitalist Progress Note  Provider : Trupti Sorto MD  Date of Service (when I saw the patient): 06/27/2019    Assessment & Plan   Brief summary of admission assessment:Krystina Kinney is a 39 year old black female with a significant past medical history of remote history of pulmonary tuberculosis, controlled HIV infection, recent admission to [facility] for tuberculous lymphadenitis and cavitary lung lesion who presents from Dr. Toro clinic. Patient was a direct treatment from clinic after  of ID so patient and reviewed patient's recent sputum acid-fast stain which came back positive.  [facility]  recommended admission to the hospital for treatment as well as discharge planning.     Admission diagnoses:      1.  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  Patient is relatively asymptomatic without any significant cough or sputum production. Her only symptom she has suggestive of tuberculosis is night sweats. Patient denies fever, loss of appetite or weight loss.  --Sputum positive for AFB stain. Quantiferon positive  --Continue ethambutol, INH, pyrazinamide, Rifampin, vitamin B6 per ID. Infectious disease following     2.  HIV infection: Controlled on medication. Continue her antiretroviral medications per ID     4.  Social: Patient is not sharing a room at home with anyone but she lives with another person who lives in a separate room in a family home.       Code Status: Full Code    Disposition: Expected discharge TBD per ID recommendations    Trupti Sorto MD    Interval History   Patient seen and examined. She stated that he is feeling better. She denies new symptoms.     -Data reviewed today: I reviewed all new labs and imaging results over the last 24 hours. I personally reviewed     Physical Exam   Temp: 99  F (37.2  C) Temp src: Oral BP: 103/64 Pulse: 87   Resp: 20 SpO2:  100 % O2 Device: None (Room air)    Vitals:    06/26/19 0425 06/27/19 0810   Weight: 69.3 kg (152 lb 11.2 oz) 68.5 kg (151 lb)     Vital Signs with Ranges  Temp:  [99  F (37.2  C)-100.3  F (37.9  C)] 99  F (37.2  C)  Pulse:  [70-87] 87  Resp:  [18-20] 20  BP: (103-123)/(64-80) 103/64  SpO2:  [98 %-100 %] 100 %  I/O last 3 completed shifts:  In: 240 [P.O.:240]  Out: -     GEN:  Alert, oriented x 3, appears comfortable, NAD.  HEENT:  Normocephalic/atraumatic, no scleral icterus, no nasal discharge, mouth moist.  CV:  Regular rate and rhythm, no murmur or JVD.  S1 + S2 noted, no S3 or S4.  LUNGS:  Clear to auscultation bilaterally without rales/rhonchi/wheezing/retractions.  Symmetric chest rise on inhalation noted.  ABD:  Active bowel sounds, soft, non-tender/non-distended.  No rebound/guarding/rigidity.  EXT:  No edema or cyanosis.  Hands/feet warm to touch with good signs of peripheral perfusion.  No joint synovitis noted.  SKIN:  Dry to touch, no exanthems noted in the visualized areas.  NEURO:  Symmetric muscle strength, sensation to touch grossly intact.  No new focal deficits appreciated.    Medications       dolutegravir  50 mg Oral Daily     emtricitabine-tenofovir AF  1 tablet Oral Daily     enoxaparin  40 mg Subcutaneous Q24H     ethambutol  1,200 mg Oral Daily     isoniazid  300 mg Oral Daily     pyrazinamide  1,500 mg Oral Daily     rifampin  600 mg Oral Q12H ALYSIA     sodium chloride (PF)  3 mL Intracatheter Q8H     vitamin B6  25 mg Oral Daily       Data   Recent Labs   Lab 06/26/19  0817 06/25/19  2241   WBC 7.7  --    HGB 11.3*  --    MCV 99  --     389     --    POTASSIUM 3.7  --    CHLORIDE 105  --    CO2 28  --    BUN 9  --    CR 0.62 0.63   ANIONGAP 3  --    RIMA 8.3*  --    *  --    ALBUMIN 2.9*  --    PROTTOTAL 8.0  --    BILITOTAL 1.0  --    ALKPHOS 89  --    ALT 17  --    AST 22  --        No results found for this or any previous visit (from the past 24 hour(s)).

## 2019-06-27 NOTE — PROGRESS NOTES
[facility]  Infectious Disease Progress Note          Assessment and Plan:   IMPRESSION:   1.  A 39-year-old [place] female, very well known to me, active pulmonary and neck lymph node tuberculosis, now admitted for public health reasons with concern for contagiousness.  Very low likelihood of actively contagious currently, as below.   2.  Recent [place] trip for 2 months, developed lymph node swelling in her right neck while there, was diagnosed as probable scrofula and started on 4-drug antituberculous treatment, which she has been taking for nearly 4 weeks.  That area continues to be about the same, we now did a needle biopsy of this which showed granulomas confirming presumed TB.   3.  Incidental finding of cavitary lung lesion with no pulmonary symptoms including no active cough currently, sputums at [facility] were initially AFB negative, but after discharge, the last smear came back 1+ positive and that PCR was also positive proving tuberculosis with no rifampin resistance, but only that resistance code on being done.  Actual cultures are pending.   4.  HIV infection, long-term, on active antiretroviral therapy and compliant with undetectable virus and T cells in the 300+ range.      RECOMMENDATIONS:   1.  The underlying diagnosis has been proven at this point.  Now the concern is for possible resistance, given previous pulmonary tuberculosis 19 years ago, rifampin at least shown sensitive now tried to get samples for direct testing for resistance in case we do not grow the organism long-term.  At this point, no need to put it off starting drug, so started 4-drug antituberculous regimen.   2.  Her actual contagiousness level should be very low, assuming she really did take the 4 weeks of prior treatment, is not coughing, is not ill.  However, with the 1 smear positive and little children in the house she is at, it has been decided that for safety reasons, she be admitted until we  "straighten out of the situation.  Exact criteria that will allow discharge not clear.   3.  TB isolation while here.   4.  Continue HIV treatment.   5 Appears HD Ok with home away from th children as early as tomorrow , with planned outpt repeat sputums         Interval History:   no new complaints and doing well; no cp, sob, n/v/d, or abd pain. No fever neck Ok              Medications:       dolutegravir  50 mg Oral Daily     emtricitabine-tenofovir AF  1 tablet Oral Daily     enoxaparin  40 mg Subcutaneous Q24H     ethambutol  1,200 mg Oral Daily     isoniazid  300 mg Oral Daily     pyrazinamide  1,500 mg Oral Daily     rifampin  600 mg Oral Q12H ALYSIA     sodium chloride (PF)  3 mL Intracatheter Q8H     vitamin B6  25 mg Oral Daily                  Physical Exam:   Blood pressure 103/64, pulse 87, temperature 99  F (37.2  C), temperature source Oral, resp. rate 20, height 1.6 m (5' 3\"), weight 68.5 kg (151 lb), SpO2 100 %.  Wt Readings from Last 2 Encounters:   06/27/19 68.5 kg (151 lb)   06/14/19 68.2 kg (150 lb 5.6 oz)     Vital Signs with Ranges  Temp:  [99  F (37.2  C)-99.4  F (37.4  C)] 99  F (37.2  C)  Pulse:  [87] 87  Resp:  [18-20] 20  BP: (103-108)/(64-66) 103/64  SpO2:  [98 %-100 %] 100 %    Constitutional: Awake, alert, cooperative, no apparent distress   Lungs: Clear to auscultation bilaterally, no crackles or wheezing   Cardiovascular: Regular rate and rhythm, normal S1 and S2, and no murmur noted   Abdomen: Normal bowel sounds, soft, non-distended, non-tender   Skin: No rashes, no cyanosis, no edema   Other: Neck same this madny be slow          Data:   All microbiology laboratory data reviewed.  Recent Labs   Lab Test 06/26/19  0817 06/25/19  2241 06/16/19  0947 06/14/19  1459   WBC 7.7  --   --  7.8   HGB 11.3*  --  10.9* 11.0*   HCT 36.3  --   --  33.9*   MCV 99  --   --  95    389  --  366     Recent Labs   Lab Test 06/26/19  0817 06/25/19  2241 06/14/19  1459   CR 0.62 0.63 0.59     No lab " results found.  Recent Labs   Lab Test 06/20/19  1053 06/18/19  1000 06/18/19  0811 06/17/19  0830 06/16/19  1032 06/16/19  0630 05/11/17  1039   CULT Culture received and in progress.  Positive AFB results are called as soon as detected.    Final report to follow in 7 to 8 weeks.    Report finalized too soon.  Additonal final report to follow. Culture received and in progress.  Positive AFB results are called as soon as detected.    Final report to follow in 7 to 8 weeks.    Assayed at [facility] [address] [facility] [facility] [address], [place] [address] [phone]  Culture received and in progress.  Positive AFB results are called as soon as detected.    Final report to follow in 7 to 8 weeks.    Assayed at [facility] [address] [facility] [address], [place] [address] [phone] Culture received and in progress.  Positive AFB results are called as soon as detected.    Final report to follow in 7 to 8 weeks.    Assayed at [facility] [address] [facility], [place] [address] [phone] Heavy growth  Normal luis    Culture received and in progress.  Positive AFB results are called as soon as detected.    Final report to follow in 7 to 8 weeks.   Light growth  Coagulase negative Staphylococcus  Susceptibility testing not routinely done  *  These bacteria are part of normal skin luis, but on occasion, may be true pathogens.    Clinical correlation must be applied to interpreting this microbiology result.  * Culture received and in progress.  Positive AFB results are called as soon as detected.    Final report to follow in 7 to 8 weeks.   50,000 to 100,000 colonies/mL mixed urogenital luis Susceptibility testing not   routinely done

## 2019-06-28 VITALS
DIASTOLIC BLOOD PRESSURE: 60 MMHG | SYSTOLIC BLOOD PRESSURE: 106 MMHG | RESPIRATION RATE: 16 BRPM | OXYGEN SATURATION: 97 % | BODY MASS INDEX: 27.05 KG/M2 | WEIGHT: 152.7 LBS | HEART RATE: 78 BPM | HEIGHT: 63 IN | TEMPERATURE: 98.2 F

## 2019-06-28 LAB
ANION GAP SERPL CALCULATED.3IONS-SCNC: 6 MMOL/L (ref 3–14)
BASOPHILS # BLD AUTO: 0 10E9/L (ref 0–0.2)
BASOPHILS NFR BLD AUTO: 0.4 %
BUN SERPL-MCNC: 11 MG/DL (ref 7–30)
CALCIUM SERPL-MCNC: 8.4 MG/DL (ref 8.5–10.1)
CHLORIDE SERPL-SCNC: 106 MMOL/L (ref 94–109)
CO2 SERPL-SCNC: 28 MMOL/L (ref 20–32)
CREAT SERPL-MCNC: 0.58 MG/DL (ref 0.52–1.04)
DIFFERENTIAL METHOD BLD: ABNORMAL
EOSINOPHIL # BLD AUTO: 0.2 10E9/L (ref 0–0.7)
EOSINOPHIL NFR BLD AUTO: 2.2 %
ERYTHROCYTE [DISTWIDTH] IN BLOOD BY AUTOMATED COUNT: 14.3 % (ref 10–15)
GFR SERPL CREATININE-BSD FRML MDRD: >90 ML/MIN/{1.73_M2}
GLUCOSE SERPL-MCNC: 90 MG/DL (ref 70–99)
HCT VFR BLD AUTO: 36.1 % (ref 35–47)
HGB BLD-MCNC: 11.4 G/DL (ref 11.7–15.7)
IMM GRANULOCYTES # BLD: 0.1 10E9/L (ref 0–0.4)
IMM GRANULOCYTES NFR BLD: 0.7 %
LYMPHOCYTES # BLD AUTO: 1.3 10E9/L (ref 0.8–5.3)
LYMPHOCYTES NFR BLD AUTO: 18.2 %
MCH RBC QN AUTO: 30.8 PG (ref 26.5–33)
MCHC RBC AUTO-ENTMCNC: 31.6 G/DL (ref 31.5–36.5)
MCV RBC AUTO: 98 FL (ref 78–100)
MONOCYTES # BLD AUTO: 0.8 10E9/L (ref 0–1.3)
MONOCYTES NFR BLD AUTO: 11.3 %
NEUTROPHILS # BLD AUTO: 4.9 10E9/L (ref 1.6–8.3)
NEUTROPHILS NFR BLD AUTO: 67.2 %
NRBC # BLD AUTO: 0 10*3/UL
NRBC BLD AUTO-RTO: 0 /100
PLATELET # BLD AUTO: 304 10E9/L (ref 150–450)
POTASSIUM SERPL-SCNC: 3.3 MMOL/L (ref 3.4–5.3)
RBC # BLD AUTO: 3.7 10E12/L (ref 3.8–5.2)
SODIUM SERPL-SCNC: 140 MMOL/L (ref 133–144)
WBC # BLD AUTO: 7.3 10E9/L (ref 4–11)

## 2019-06-28 PROCEDURE — G0463 HOSPITAL OUTPT CLINIC VISIT: HCPCS

## 2019-06-28 PROCEDURE — 80048 BASIC METABOLIC PNL TOTAL CA: CPT | Performed by: INTERNAL MEDICINE

## 2019-06-28 PROCEDURE — 85049 AUTOMATED PLATELET COUNT: CPT | Performed by: INTERNAL MEDICINE

## 2019-06-28 PROCEDURE — 99239 HOSP IP/OBS DSCHRG MGMT >30: CPT | Performed by: INTERNAL MEDICINE

## 2019-06-28 PROCEDURE — 85025 COMPLETE CBC W/AUTO DIFF WBC: CPT | Performed by: INTERNAL MEDICINE

## 2019-06-28 PROCEDURE — 25000132 ZZH RX MED GY IP 250 OP 250 PS 637: Performed by: INTERNAL MEDICINE

## 2019-06-28 PROCEDURE — 36415 COLL VENOUS BLD VENIPUNCTURE: CPT | Performed by: INTERNAL MEDICINE

## 2019-06-28 RX ADMIN — EMTRICITABINE AND TENOFOVIR ALAFENAMIDE 1 TABLET: 200; 25 TABLET ORAL at 09:32

## 2019-06-28 RX ADMIN — POTASSIUM CHLORIDE 20 MEQ: 1500 TABLET, EXTENDED RELEASE ORAL at 11:55

## 2019-06-28 RX ADMIN — Medication 25 MG: at 09:32

## 2019-06-28 RX ADMIN — ETHAMBUTOL HYDROCHLORIDE 1200 MG: 400 TABLET, FILM COATED ORAL at 09:32

## 2019-06-28 RX ADMIN — DOLUTEGRAVIR SODIUM 50 MG: 50 TABLET, FILM COATED ORAL at 09:32

## 2019-06-28 RX ADMIN — RIFAMPIN 600 MG: 300 CAPSULE ORAL at 09:31

## 2019-06-28 RX ADMIN — PYRAZINAMIDE 1500 MG: 500 TABLET ORAL at 09:32

## 2019-06-28 RX ADMIN — ISONIAZID 300 MG: 300 TABLET ORAL at 09:32

## 2019-06-28 RX ADMIN — POTASSIUM CHLORIDE 40 MEQ: 1500 TABLET, EXTENDED RELEASE ORAL at 09:32

## 2019-06-28 ASSESSMENT — ACTIVITIES OF DAILY LIVING (ADL)
ADLS_ACUITY_SCORE: 10

## 2019-06-28 ASSESSMENT — MIFFLIN-ST. JEOR: SCORE: 1326.77

## 2019-06-28 NOTE — PLAN OF CARE
AVS reviewed with pt. All questions answered. Pt denies any further questions or concerns. PIV removed, no complications. Telemetry monitor removed. All belongings returned. Pt escorted to front door by [facility] staff.

## 2019-06-28 NOTE — PLAN OF CARE
Pt AOx4. BPs soft 106/60 but asymptomatic, afeb, on RA sating 97%. Iso-Airborne & Contact precautions maintained for TB+. K+ 3.3 replaced this AM.  services utilized for assessment and cares. Right neck dressing changed CDI. Had moderate white/yellow purulent drainage. Up ind in room. Showered this AM. Tolerating reg diet. Plan - discharge later this afternoon back to prior living arrangement. [facility] following/ID/ following. Will continue to monitor.

## 2019-06-28 NOTE — DISCHARGE INSTRUCTIONS
Plan of care for wound located on Right neck: Daily and PRN  1. Spritz with wound spray, pat dry  2. No sting DAILY to periwound for good skin care  3. Cut a small piece of Mesalt ribbon and tuck into and over wound base  4. Cover with 2 4x4 gauze pads folded; secure with Medipore tape

## 2019-06-28 NOTE — PROGRESS NOTES
[facility]    Hospitalist Progress Note  Provider : Trupti Sorto MD  Date of Service (when I saw the patient): 06/28/2019    Assessment & Plan   Brief summary of admission assessment:Krystina Kinney is a 39 year old black female with a significant past medical history of remote history of pulmonary tuberculosis, controlled HIV infection, recent admission to [facility] for tuberculous lymphadenitis and cavitary lung lesion who presents from Dr. Toro clinic. Patient was a direct treatment from clinic after  of ID so patient and reviewed patient's recent sputum acid-fast stain which came back positive.  [facility] recommended admission to the hospital for treatment as well as discharge planning.     Admission diagnoses:      1.  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  Patient is relatively asymptomatic without any significant cough or sputum production. Her only symptom she has suggestive of tuberculosis is night sweats. Patient denies fever, loss of appetite or weight loss.  --Sputum positive for AFB stain. Quantiferon positive  --Continue ethambutol, INH, pyrazinamide, Rifampin, vitamin B6 per ID. Infectious disease following     2.  HIV infection: Controlled on medication. Continue her antiretroviral medications per ID     4.  Social: Patient is not sharing a room at home with anyone but she lives with another person who lives in a separate room in a family home. Anticipate discharge to home when ok with ID      Code Status: Full Code    Disposition: Expected discharge TBD per ID recommendations    Trupti Sorto MD    Interval History   Patient seen and examined.she denies new complaints. Denies cough. No fever    -Data reviewed today: I reviewed all new labs and imaging results over the last 24 hours. I personally reviewed     Physical Exam   Temp: 98.2  F (36.8  C) Temp src: Oral BP: 106/60  Pulse: 78   Resp: 16 SpO2: 97 % O2 Device: None (Room air)    Vitals:    06/26/19 0425 06/27/19 0810 06/28/19 0625   Weight: 69.3 kg (152 lb 11.2 oz) 68.5 kg (151 lb) 69.3 kg (152 lb 11.2 oz)     Vital Signs with Ranges  Temp:  [97.5  F (36.4  C)-98.2  F (36.8  C)] 98.2  F (36.8  C)  Pulse:  [78-96] 78  Resp:  [16] 16  BP: (106-111)/(60-67) 106/60  SpO2:  [93 %-97 %] 97 %  I/O last 3 completed shifts:  In: 240 [P.O.:240]  Out: -     GEN:  Alert, oriented x 3, appears comfortable, NAD.  HEENT:  Normocephalic/atraumatic, no scleral icterus, no nasal discharge, mouth moist.  CV:  Regular rate and rhythm, no murmur or JVD.  S1 + S2 noted, no S3 or S4.  LUNGS:  Clear to auscultation bilaterally without rales/rhonchi/wheezing/retractions.  Symmetric chest rise on inhalation noted.  ABD:  Active bowel sounds, soft, non-tender/non-distended.  No rebound/guarding/rigidity.  EXT:  No edema or cyanosis.  Hands/feet warm to touch with good signs of peripheral perfusion.  No joint synovitis noted.  SKIN:  Dry to touch, no exanthems noted in the visualized areas.  NEURO:  Symmetric muscle strength, sensation to touch grossly intact.  No new focal deficits appreciated.    Medications       dolutegravir  50 mg Oral Daily     emtricitabine-tenofovir AF  1 tablet Oral Daily     enoxaparin  40 mg Subcutaneous Q24H     ethambutol  1,200 mg Oral Daily     isoniazid  300 mg Oral Daily     pyrazinamide  1,500 mg Oral Daily     rifampin  600 mg Oral Q12H ALYSIA     sodium chloride (PF)  3 mL Intracatheter Q8H     vitamin B6  25 mg Oral Daily       Data   Recent Labs   Lab 06/28/19  0755 06/26/19  0817 06/25/19  2241   WBC 7.3 7.7  --    HGB 11.4* 11.3*  --    MCV 98 99  --     325 389    136  --    POTASSIUM 3.3* 3.7  --    CHLORIDE 106 105  --    CO2 28 28  --    BUN 11 9  --    CR 0.58 0.62 0.63   ANIONGAP 6 3  --    RIMA 8.4* 8.3*  --    GLC 90 105*  --    ALBUMIN  --  2.9*  --    PROTTOTAL  --  8.0  --    BILITOTAL  --  1.0   --    ALKPHOS  --  89  --    ALT  --  17  --    AST  --  22  --        No results found for this or any previous visit (from the past 24 hour(s)).

## 2019-06-28 NOTE — PROGRESS NOTES
[facility]  Infectious Disease Progress Note          Assessment and Plan:   IMPRESSION:   1.  A 39-year-old [place] female, very well known to me, active pulmonary and neck lymph node tuberculosis, now admitted for public health reasons with concern for contagiousness.  Very low likelihood of actively contagious currently, as below.   2.  Recent [place] trip for 2 months, developed lymph node swelling in her right neck while there, was diagnosed as probable scrofula and started on 4-drug antituberculous treatment, which she has been taking for nearly 4 weeks.  That area continues to be about the same, we now did a needle biopsy of this which showed granulomas confirming presumed TB.   3.  Incidental finding of cavitary lung lesion with no pulmonary symptoms including no active cough currently, sputums at [facility] were initially AFB negative, but after discharge, the last smear came back 1+ positive and that PCR was also positive proving tuberculosis with no rifampin resistance, but only that resistance code on being done.  Actual cultures are pending.   4.  HIV infection, long-term, on active antiretroviral therapy and compliant with undetectable virus and T cells in the 300+ range.      RECOMMENDATIONS:   1.  The underlying diagnosis has been proven at this point.  Now the concern is for possible resistance, given previous pulmonary tuberculosis 19 years ago, rifampin at least shown sensitive now tried to get samples for direct testing for resistance in case we do not grow the organism long-term.  At this point, no need to put it off starting drug, so started 4-drug antituberculous regimen. Tolerating  2.  Her actual contagiousness level should be very low, assuming she really did take the 4 weeks of prior treatment, is not coughing, is not ill.  However, with the 1 smear positive and little children in the house she is at, it has been decided that for safety reasons, she be admitted ,  "now clear to go away from children.   3.  TB isolation while here.   4.  Continue HIV treatment.   5 Appears HD Ok with home away from th children with planned outpt repeat sputums , MD smear neg, get another now and later more as outpt  6 Form filed out, OK disposition        Interval History:   no new complaints and doing well; no cp, sob, n/v/d, or abd pain. No fever neck Ok              Medications:       dolutegravir  50 mg Oral Daily     emtricitabine-tenofovir AF  1 tablet Oral Daily     enoxaparin  40 mg Subcutaneous Q24H     ethambutol  1,200 mg Oral Daily     isoniazid  300 mg Oral Daily     pyrazinamide  1,500 mg Oral Daily     rifampin  600 mg Oral Q12H ALYSIA     sodium chloride (PF)  3 mL Intracatheter Q8H     vitamin B6  25 mg Oral Daily                  Physical Exam:   Blood pressure 106/60, pulse 78, temperature 98.2  F (36.8  C), temperature source Oral, resp. rate 16, height 1.6 m (5' 3\"), weight 69.3 kg (152 lb 11.2 oz), SpO2 97 %.  Wt Readings from Last 2 Encounters:   06/28/19 69.3 kg (152 lb 11.2 oz)   06/14/19 68.2 kg (150 lb 5.6 oz)     Vital Signs with Ranges  Temp:  [97.5  F (36.4  C)-98.2  F (36.8  C)] 98.2  F (36.8  C)  Pulse:  [78-96] 78  Resp:  [16] 16  BP: (106-111)/(60-67) 106/60  SpO2:  [93 %-97 %] 97 %    Constitutional: Awake, alert, cooperative, no apparent distress   Lungs: Clear to auscultation bilaterally, no crackles or wheezing   Cardiovascular: Regular rate and rhythm, normal S1 and S2, and no murmur noted   Abdomen: Normal bowel sounds, soft, non-distended, non-tender   Skin: No rashes, no cyanosis, no edema   Other: Neck same this mandy be slow          Data:   All microbiology laboratory data reviewed.  Recent Labs   Lab Test 06/28/19  0755 06/26/19  0817 06/25/19  2241 06/16/19  0947 06/14/19  1459   WBC 7.3 7.7  --   --  7.8   HGB 11.4* 11.3*  --  10.9* 11.0*   HCT 36.1 36.3  --   --  33.9*   MCV 98 99  --   --  95    325 389  --  366     Recent Labs   Lab Test " 06/28/19  0755 06/26/19  0817 06/25/19  2241   CR 0.58 0.62 0.63     No lab results found.  Recent Labs   Lab Test 06/20/19  1053 06/18/19  1000 06/18/19  0811 06/17/19  0830 06/16/19  1032 06/16/19  0630 05/11/17  1039   CULT Culture received and in progress.  Positive AFB results are called as soon as detected.    Final report to follow in 7 to 8 weeks.    Report finalized too soon.  Additonal final report to follow. Culture received and in progress.  Positive AFB results are called as soon as detected.    Final report to follow in 7 to 8 weeks.    Assayed at [facility] [address], [facility] [place] [address] [phone]  Culture received and in progress.  Positive AFB results are called as soon as detected.    Final report to follow in 7 to 8 weeks.    Assayed at [facility] [address], [facility] [place] [address] [phone] Culture received and in progress.  Positive AFB results are called as soon as detected.    Final report to follow in 7 to 8 weeks.    Assayed at [facility] [address], [facility] [place] [address] [phone] Heavy growth  Normal luis    Culture received and in progress.  Positive AFB results are called as soon as detected.    Final report to follow in 7 to 8 weeks.   Light growth  Coagulase negative Staphylococcus  Susceptibility testing not routinely done  *  These bacteria are part of normal skin luis, but on occasion, may be true pathogens.    Clinical correlation must be applied to interpreting this microbiology result.  * Culture received and in progress.  Positive AFB results are called as soon as detected.    Final report to follow in 7 to 8 weeks.   50,000 to 100,000 colonies/mL mixed urogenital luis Susceptibility testing not   routinely done

## 2019-06-28 NOTE — PLAN OF CARE
A&Ox4, VSS. Denies pain.  LS diminished/clear.  O2 sats 93% on RA.   Wound to her right side of her neck intact with dressing and dry, no drainage noted. Ind in room.  Calls appropriately for assist.  ID and WOC following.  Plan to discharge per ID recommendations.

## 2019-06-28 NOTE — DISCHARGE SUMMARY
[facility]    Discharge Summary  Hospitalist    Date of Admission:  6/25/2019  Date of Discharge:  6/28/2019  3:06 PM  Discharging Provider: Trupti Sorto MD  Date of Service (when I saw the patient): 06/28/19    Discharge Diagnoses     1.  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  HIV infection     Code Status: Full Code    History of Present Illness   Krystina Kinney is an 39 year old female who was admitted for treatment for tuberculosis.    Hospital Course   summary of admission assessment:Krystina Kinney is a 39 year old black female with a significant past medical history of remote history of pulmonary tuberculosis, controlled HIV infection, recent admission to [facility] for tuberculous lymphadenitis and cavitary lung lesion who presents from Dr. Toro clinic. Patient was a direct treatment from clinic after  of ID so patient and reviewed patient's recent sputum acid-fast stain which came back positive.  [facility] recommended admission to the hospital for treatment as well as discharge planning.     Admission diagnoses:      1.  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  Patient is relatively asymptomatic without any significant cough or sputum production. Her only symptom she has suggestive of tuberculosis is night sweats. Patient denies fever, loss of appetite or weight loss. Sputum positive for AFB stain. Quantiferon positive.  Infectious disease was consulted and she was treated with ethambutol, INH, pyrazinamide, Rifampin, vitamin B6 per ID.  Dr. Toro arranged for tuberculosis treatment with the [facility] and patient was discharged home. She was advised to stay away from children at home.     2.  HIV infection: Controlled on medication. Continue her antiretroviral medications per ID     Patient remained stable.  She was discharged home  with a plan to continue her HIV medication and antituberculosis treatment as ordered by infectious disease. She was discharged stable condition.     Code Status: Full Code    Significant Results and Procedures   Results for orders placed or performed during the hospital encounter of 06/14/19   Soft tissue neck CT w contrast    Narrative    CT SCAN OF THE NECK WITH CONTRAST  6/14/2019 5:04 PM     HISTORY: Lower right supraclavicular/anterior cervical swelling,  evaluate abscess, lymphadenitis. History of tuberculosis, original  biopsy of nodes from 2011 showed chronic suppurative lymphadenitis  suggestive of tuberculosis. Patient being treated with Pyrazinamide.    TECHNIQUE: Axial images and coronal reformations. Radiation dose for  this scan was reduced using automated exposure control, adjustment of  the mA and/or kV according to patient size, or iterative  reconstruction technique. 90mL Isovue-370 IV.    COMPARISON: None.    FINDINGS: There is an irregularly-shaped mass in the lower anterior  right neck with irregular peripheral enhancement around low  attenuation center with no calcification. The mass is 4.9 x 5.0 x 5.3  cm diameter and is in the level V lymph node chain. More posteriorly  and inferiorly, there are additional lesions consistent with necrotic  nodes in the supraclavicular fossa, and another cluster similar nodes  are seen high in the right axilla. There is no adenopathy in the left  neck.    The orbits, sinuses, parotid glands, submandibular glands and thyroid  gland appear normal. The tongue and airway appear normal. No bone  lesions are seen.    Scans through the upper lungs show a partially cavitary mass or  infiltrate in the left upper lobe centrally measuring 4.5 x 2.6 x 2.6  cm diameter. In addition, there are multiple air cysts bilaterally  throughout both lungs. Most of these are thin-walled, but a few have  slightly thickened walls.      Impression    IMPRESSION:  1. Confluent necrotic  lymph nodes in the lower right neck and right  axilla consistent with scrofula.  2. Partially necrotic left upper lobe mass. Differential diagnosis  includes tuberculosis and lung cancer.  3. Multiple air cysts in the lungs. Differential diagnosis is broad  and includes emphysema, alpha-1 antitrypsin deficiency and  lymphangiomyomatosis.    I called the report to Dr. Cecil Mullins in the emergency room at 5:40  PM on 6/14/2019.    DONNA BELLE MD   Chest CT w/o contrast    Narrative    CT CHEST WITHOUT CONTRAST   6/14/2019 6:50 PM     HISTORY: Further evaluation of lung lesions    TECHNIQUE: No IV contrast material. Radiation dose for this scan was  reduced using automated exposure control, adjustment of the mA and/or  kV according to patient size, or iterative reconstruction technique.    COMPARISON: CT neck 6/14/2019    FINDINGS: Noncontrast images of the chest less well demonstrate right  supraclavicular adenopathy. There are multiple benign-appearing,  thin-walled cystic lesions scattered throughout the lungs. There is  airspace opacity in the anterolateral left upper lobe with some  cavitation.    There is a left upper lobe nodule that measures 6 mm (series 4, image  131). There is another nodule in the right lower lobe that measures 6  mm (series 4, image 152).    No pleural or pericardial effusion. No mediastinal or hilar  adenopathy. There are mildly enlarged left periaortic lymph nodes, one  of which at the inferior margin of the study measures 2.4 x 1.4 cm  (series 2, image 60).      Impression    IMPRESSION:  1. Cavitary airspace opacity in the left upper lobe is likely  infectious and active tuberculosis is a consideration. This is  associated with necrotic supraclavicular adenopathy on the right may  represent scrofula.  2. Cystic lung disease with thin-walled, benign-appearing cysts. This  may be related Langerhans cell histiocytosis or  lymphangioleiomyomatosis. Emphysema considered less likely.  3.  Incidental 6 mm pulmonary nodules. Consider follow-up CT in 6-12  months.  4. Probable retroperitoneal adenopathy is incompletely evaluated on  this study.    NICK ARRIAZA MD   XR Chest 2 Views    Narrative    CHEST TWO VIEWS  6/16/2019 9:12 AM     HISTORY: Tuberculosis.    COMPARISON: CT neck and chest 6/14/2019      Impression    IMPRESSION: Left upper lobe masslike infiltrate is unchanged. Right  lower neck and axillary soft tissue swelling is unchanged. No pleural  effusion, no other infiltrates.    DONNA BELLE MD   US Biopsy Lymph Node Core    Narrative    Procedure(s):  Ultrasound-guided lymph node biopsy    Date of Procedure: 6/18/2019    Operators:    Samuel Cabello MD    Medications:  1% Lidocaine SQ    Contrast: None    Referenced air kerma: 0 mGy  Fluoroscopy time: 0 minutes    Estimated blood loss: Minimal    Complications: None    Pre-procedure diagnosis: Tuberculosis  Post-procedure diagnosis: Same    Clinical History/Indication: 39-year-old female with active  tuberculosis in large necrotic lymph node in the right supraclavicular  region.    Procedure and Findings:  Following a discussion of the risks, benefits, indications, and  alternatives to treatment, appropriate informed consent was obtained  from the patient.  The patient was seen at bedside and placed supine  on the table. The right supraclavicular region was prepped and draped  in the usual sterile fashion.  A timeout was performed per hospital  universal protocol policy to confirm the correct patient, site and  procedure to be performed.    Preliminary ultrasound of the right neck was performed to assess for  appropriate sites for access.  These images identify large necrotic  lymph node and an ultrasound image was archived. An appropriate site  for skin access was identified and the overlying soft tissues were  infiltrated with 1% Lidocaine for local anesthesia. Under direct  ultrasound visualization an 17-gauge trocar needle was  advanced into  the target lesion. Approximately 20 mL of purulent fluid was aspirated  through the trocar. Then, through the trocar needle, an 18-gauge Bard  spring loaded biopsy was used to obtain 4 core biopsy specimen. The  biopsy samples were visually inspected and when they were found to be  adequate they were submitted to pathology. All needles were removed  and hemostasis was achieved with manual compression. A sterile  dressing was applied.     Throughout the procedure, the patient was monitored by a radiology  nurse for cardiac rhythm, blood pressure and oxygen saturation which  remained stable. The patient tolerated the procedure well and left  interventional radiology in stable condition.      Impression    Impression:  Ultrasound-guided targeted lymph node biopsy, as described    CHRIS RODRIGUEZ MD         Pending Results   None  Code Status   Full Code       Primary Care Physician   Johnny Vinson    0    Discharge Disposition   Discharged to home  Condition at discharge: Stable    Consultations This Hospital Stay   PHARMACY IP CONSULT  INFECTIOUS DISEASES IP CONSULT  WOUND OSTOMY CONTINENCE NURSE  IP CONSULT    Time Spent on this Encounter   I, Trupti Sorto MD, personally saw the patient today and spent greater than 30 minutes discharging this patient.    Discharge Orders      Reason for your hospital stay    disseminated TB     Activity    Your activity upon discharge: activity as tolerated     Follow-up and recommended labs and tests     Follow up with primary care provider, Johnny Vinson, within 7 days   Follow up with infectious disease for treatment of tuberculosis  Continue your home medications as ordered  Stay away from the children at home     Diet    Follow this diet upon discharge: Orders Placed This Encounter      Combination Diet Regular Diet Adult     Discharge Medications   Discharge Medication List as of 6/28/2019  2:46 PM      CONTINUE these  medications which have NOT CHANGED    Details   DESCOVY 200-25 MG per tablet Take 1 tablet by mouth daily, R-2, CARLOS MANUEL, Historical      TIVICAY 50 MG tablet Take 50 mg by mouth daily, R-0, CARLOS MANUEL, Historical             Allergies   No Known Allergies  Data   Most Recent 3 CBC's:  Recent Labs   Lab Test 06/28/19  0755 06/26/19  0817 06/25/19  2241 06/16/19  0947 06/14/19  1459   WBC 7.3 7.7  --   --  7.8   HGB 11.4* 11.3*  --  10.9* 11.0*   MCV 98 99  --   --  95    325 389  --  366      Most Recent 3 BMP's:  Recent Labs   Lab Test 06/28/19  0755 06/26/19  0817 06/25/19 2241 06/14/19  1459    136  --  137   POTASSIUM 3.3* 3.7  --  3.8   CHLORIDE 106 105  --  104   CO2 28 28  --  32   BUN 11 9  --  10   CR 0.58 0.62 0.63 0.59   ANIONGAP 6 3  --  1*   RIMA 8.4* 8.3*  --  9.1   GLC 90 105*  --  97     Most Recent 2 LFT's:  Recent Labs   Lab Test 06/26/19  0817   AST 22   ALT 17   ALKPHOS 89   BILITOTAL 1.0     Most Recent INR's and Anticoagulation Dosing History:  Anticoagulation Dose History     There is no flowsheet data to display.        Most Recent 3 Troponin's:No lab results found.  Most Recent Cholesterol Panel:No lab results found.  Most Recent 6 Bacteria Isolates From Any Culture (See EPIC Reports for Culture Details):  Recent Labs   Lab Test 06/20/19  1053 06/18/19  1000 06/18/19  0811 06/17/19  0830 06/16/19  1032 06/16/19  0630   CULT Culture received and in progress.  Positive AFB results are called as soon as detected.    Final report to follow in 7 to 8 weeks.    Report finalized too soon.  Additonal final report to follow. Culture received and in progress.  Positive AFB results are called as soon as detected.    Final report to follow in 7 to 8 weeks.    Assayed at [facility] [employer] [address] [place] [address] [phone]  Culture received and in progress.  Positive AFB results are called as soon as detected.    Final report to follow in 7 to 8 weeks.    Assayed at [facility]  [employer] [address] [facility], [place] [id number] [phone] Culture received and in progress.  Positive AFB results are called as soon as detected.    Final report to follow in 7 to 8 weeks.    Assayed at [facility] [address] [place] [address] [phone] Heavy growth  Normal luis    Culture received and in progress.  Positive AFB results are called as soon as detected.    Final report to follow in 7 to 8 weeks.   Light growth  Coagulase negative Staphylococcus  Susceptibility testing not routinely done  *  These bacteria are part of normal skin luis, but on occasion, may be true pathogens.    Clinical correlation must be applied to interpreting this microbiology result.  * Culture received and in progress.  Positive AFB results are called as soon as detected.    Final report to follow in 7 to 8 weeks.       Most Recent TSH, T4 and A1c Labs:No lab results found.

## 2019-07-01 NOTE — TELEPHONE ENCOUNTER
Called Deena LYON with [facility]     M advising pt will need to be seen to discuss - last OV here May 2017, since then pt seen twice (also in 2017) at [facility] - not sure that we are her PCP, or how we can direct her care when haven't seen pt in over 2 years, also recommended she discuss this with pt/determine who she is going to follow up with     Olivia CLEMENTE RN

## 2019-07-01 NOTE — TELEPHONE ENCOUNTER
Deena RN case manager [facility]  [phone] ok to leave Vm     This is a Hospital follow up call from Deena.  This is pt's econd admission this month concerning symptoms on 06/25.  IV medication is needed.  Pt has tuberculosis.  Medication review and follow up appts made .  Is there any plan of care that is suggestive?  Deena is willing to stay in contact with pt to foresee that she carry out care.

## 2019-07-02 DIAGNOSIS — A18.89 TUBERCULOSIS OF ORGANS: Primary | ICD-10-CM

## 2019-07-02 PROCEDURE — 87116 MYCOBACTERIA CULTURE: CPT | Mod: 90 | Performed by: INTERNAL MEDICINE

## 2019-07-02 PROCEDURE — 87206 SMEAR FLUORESCENT/ACID STAI: CPT | Mod: 90 | Performed by: INTERNAL MEDICINE

## 2019-07-02 PROCEDURE — 99000 SPECIMEN HANDLING OFFICE-LAB: CPT | Performed by: INTERNAL MEDICINE

## 2019-07-02 NOTE — TELEPHONE ENCOUNTER
ED / Discharge Outreach Protocol    Patient Contact    Attempt # 2    Was call answered?  No. Left message on voicemail with information to call STEFANO CARDENAS RN

## 2019-07-03 DIAGNOSIS — A18.89 TUBERCULOSIS OF ORGANS: ICD-10-CM

## 2019-07-03 LAB
ANNOTATION COMMENT IMP: NORMAL
DEPRECATED M TB RPOB XXX QL NAA+PROBE: NORMAL
M TB DNA SPEC QL NAA+PROBE: NORMAL
SPECIMEN SOURCE: NORMAL

## 2019-07-03 PROCEDURE — 99000 SPECIMEN HANDLING OFFICE-LAB: CPT | Performed by: INTERNAL MEDICINE

## 2019-07-03 PROCEDURE — 87206 SMEAR FLUORESCENT/ACID STAI: CPT | Mod: 90 | Performed by: INTERNAL MEDICINE

## 2019-07-03 PROCEDURE — 87116 MYCOBACTERIA CULTURE: CPT | Mod: 90 | Performed by: INTERNAL MEDICINE

## 2019-07-05 LAB
ACID FAST STN SPEC QL: NORMAL
SPECIMEN SOURCE: NORMAL
SPECIMEN SOURCE: NORMAL

## 2019-07-11 ENCOUNTER — TRANSFERRED RECORDS (OUTPATIENT)
Dept: HEALTH INFORMATION MANAGEMENT | Facility: CLINIC | Age: 39
End: 2019-07-11

## 2019-07-12 LAB
MYCOBACTERIUM SPEC CULT: ABNORMAL
SPECIMEN SOURCE: ABNORMAL

## 2019-07-15 LAB
ACID FAST STN SPEC QL: ABNORMAL
ACID FAST STN SPEC QL: ABNORMAL
MYCOBACTERIUM SPEC CULT: ABNORMAL
SPECIMEN SOURCE: ABNORMAL
SPECIMEN SOURCE: ABNORMAL

## 2019-07-17 ENCOUNTER — OFFICE VISIT (OUTPATIENT)
Dept: FAMILY MEDICINE | Facility: CLINIC | Age: 39
End: 2019-07-17
Payer: COMMERCIAL

## 2019-07-17 VITALS
HEART RATE: 81 BPM | OXYGEN SATURATION: 97 % | TEMPERATURE: 97 F | DIASTOLIC BLOOD PRESSURE: 86 MMHG | BODY MASS INDEX: 26.75 KG/M2 | SYSTOLIC BLOOD PRESSURE: 132 MMHG | HEIGHT: 63 IN | WEIGHT: 151 LBS

## 2019-07-17 DIAGNOSIS — Z01.419 WELL FEMALE EXAM WITH ROUTINE GYNECOLOGICAL EXAM: ICD-10-CM

## 2019-07-17 DIAGNOSIS — A18.89: Primary | ICD-10-CM

## 2019-07-17 DIAGNOSIS — B20: Primary | ICD-10-CM

## 2019-07-17 PROCEDURE — 99214 OFFICE O/P EST MOD 30 MIN: CPT | Performed by: INTERNAL MEDICINE

## 2019-07-17 ASSESSMENT — MIFFLIN-ST. JEOR: SCORE: 1319.06

## 2019-07-17 NOTE — PATIENT INSTRUCTIONS
Continue to follow up with Infectious Disease specialist.    Seek immediate medical attention if you develop fever/chills, persistent/worsening/severe headache, neck stiffness, cough, chest pain, shortness of breath, abdominal pain, vomiting, diarrhea.    Follow up with OB-GYN for your Cervical Cancer screening.

## 2019-07-17 NOTE — PROGRESS NOTES
Subjective     Krystina Kinney is a 39 year old female who presents to clinic today for the following health issues:    HPI       Hospital Follow-up Visit:    Hospital/Nursing Home/IP Rehab Facility: [facility]  Date of Admission: 6/25/19  Date of Discharge: 6/28/19  Reason(s) for Admission: Extrapulmonary TB; Scrofula            Problems taking medications regularly:  None       Medication changes since discharge: None       Problems adhering to non-medication therapy:  None    Summary of hospitalization:  [facility] discharge summary reviewed  Diagnostic Tests/Treatments reviewed.  Follow up needed: none  Other Healthcare Providers Involved in Patient s Care: Infectious disease  Update since discharge: improved.     Post Discharge Medication Reconciliation: discharge medications reconciled, continue medications without change.  Plan of care communicated with patient     Coding guidelines for this visit:  Type of Medical   Decision Making Face-to-Face Visit       within 7 Days of discharge Face-to-Face Visit        within 14 days of discharge   Moderate Complexity [id number]   High Complexity [id number]            Since the patient's discharge from the hospital, she has noted decreased size of the right supra clavicular scrofula which is still draining some purulent discharge.  Denies significant pain.  No fever/chills, sore throat or swallowing problems, cough, chest pain, shortness of breath.      Past Medical History:   Diagnosis Date     Chronic midline low back pain with left-sided sciatica 5/11/2017     Human immunodeficiency virus (HIV) disease (H) 5/11/2017     Pulmonary tuberculosis 2004    diagnosed and treated in [facility]- treatment x 2 months- unknown drug(s) and other details       Review of Systems   Constitutional: Negative for chills, fatigue, fever and unexpected weight change.   HENT: Negative for sore throat, trouble swallowing and voice change.    Respiratory: Negative for  "cough and shortness of breath.    Cardiovascular: Negative for chest pain.   Gastrointestinal: Negative for abdominal pain, diarrhea, nausea and vomiting.   Neurological: Negative for headaches.       /86 (BP Location: Left arm, Patient Position: Chair, Cuff Size: Adult Large)   Pulse 81   Temp 97  F (36.1  C) (Oral)   Ht 1.6 m (5' 3\")   Wt 68.5 kg (151 lb)   SpO2 97%   Breastfeeding? No   BMI 26.75 kg/m        Physical Exam   Constitutional: She is oriented to person, place, and time. No distress.   HENT:   Mouth/Throat: Oropharynx is clear and moist.   Neck: No tracheal deviation present.   (+) Firm nonerythematous mildly tender round mass at the right supraclavicular area   Cardiovascular: Normal rate, regular rhythm and normal heart sounds. Exam reveals no friction rub.   Pulmonary/Chest: Effort normal and breath sounds normal. No respiratory distress.   Abdominal: Soft. She exhibits no mass (No hepatomegaly). There is no tenderness.   Lymphadenopathy:     She has no cervical adenopathy.   Neurological: She is alert and oriented to person, place, and time. Coordination normal.   Skin: No rash noted.   Vitals reviewed.        ICD-10-CM    1. 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 Well female exam with routine gynecological exam Z01.419 OB/GYN REFERRAL       Patient Instructions   Continue to follow up with Infectious Disease specialist.    Seek immediate medical attention if you develop fever/chills, persistent/worsening/severe headache, neck stiffness, cough, chest pain, shortness of breath, abdominal pain, vomiting, diarrhea.    Follow up with OB-GYN for your Cervical Cancer screening.      "

## 2019-07-18 ASSESSMENT — ENCOUNTER SYMPTOMS
NAUSEA: 0
SHORTNESS OF BREATH: 0
SORE THROAT: 0
FEVER: 0
DIARRHEA: 0
TROUBLE SWALLOWING: 0
ABDOMINAL PAIN: 0
FATIGUE: 0
VOICE CHANGE: 0
UNEXPECTED WEIGHT CHANGE: 0
VOMITING: 0
CHILLS: 0
HEADACHES: 0
COUGH: 0

## 2019-07-26 LAB
MYCOBACTERIUM SPEC CULT: ABNORMAL
SPECIMEN SOURCE: ABNORMAL

## 2019-07-30 ENCOUNTER — TELEPHONE (OUTPATIENT)
Dept: FAMILY MEDICINE | Facility: CLINIC | Age: 39
End: 2019-07-30

## 2019-07-30 NOTE — TELEPHONE ENCOUNTER
Reason for Call:  Other     Detailed comments: Deena/[facility] Nurse  called to let Dr Vinson know that pt met her goals and case will be closed.     Phone Number Patient can be reached at: Home number on file [phone] (home)    Best Time: anytime     Can we leave a detailed message on this number? YES    Call taken on 7/30/2019 at 1:13 PM by Gerard Beck

## 2019-08-06 ENCOUNTER — TRANSFERRED RECORDS (OUTPATIENT)
Dept: HEALTH INFORMATION MANAGEMENT | Facility: CLINIC | Age: 39
End: 2019-08-06

## 2019-08-12 LAB
MYCOBACTERIUM SPEC CULT: NORMAL
SPECIMEN SOURCE: NORMAL

## 2019-08-13 LAB
MYCOBACTERIUM SPEC CULT: NORMAL
SPECIMEN SOURCE: NORMAL

## 2019-08-28 LAB
MYCOBACTERIUM SPEC CULT: NORMAL
MYCOBACTERIUM SPEC CULT: NORMAL
SPECIMEN SOURCE: NORMAL

## 2019-08-29 LAB
MYCOBACTERIUM SPEC CULT: NORMAL
SPECIMEN SOURCE: NORMAL

## 2019-09-09 ENCOUNTER — TRANSFERRED RECORDS (OUTPATIENT)
Dept: HEALTH INFORMATION MANAGEMENT | Facility: CLINIC | Age: 39
End: 2019-09-09

## 2019-10-21 ENCOUNTER — TRANSFERRED RECORDS (OUTPATIENT)
Dept: HEALTH INFORMATION MANAGEMENT | Facility: CLINIC | Age: 39
End: 2019-10-21

## 2020-01-30 NOTE — PLAN OF CARE
DX :    Tuberculosis/HIV   HX  :  Tuberculosis/ HIV  LABS : Shoaib 8.3  TELE:   None  GI/ : Continent of B&B.  DIET : Regular   ASSESS; A&OX4. LS diminished on all lung fields. Independent in room. Wound to her right side of her neck intact with dressing and dry, no drainage noted.   PAIN : Denied pain.  ACTIVITY :  up independently.   TEACHING : Use pf incentive spirometer.  PLAN FOR DISCHARGE : TDB per ID recommendation   -Stable  -Continue to monitor

## 2020-04-23 ENCOUNTER — TELEPHONE (OUTPATIENT)
Dept: FAMILY MEDICINE | Facility: CLINIC | Age: 40
End: 2020-04-23

## 2020-04-23 NOTE — TELEPHONE ENCOUNTER
Panel Management Review      Patient has the following on her problem list: None      Composite cancer screening  Chart review shows that this patient is due/due soon for the following Pap Smear and Influenza Vaccination  Summary:    Patient is due/failing the following:   Influenza vaccination, PAP and PHQ9    Action needed:   Verify if has had any of the above completed. Need updated PHQ9    Type of outreach:    Phone, left message for patient to call back.     Questions for provider review:    None                                                                                                                                    Joey Azul CMA on 4/23/2020 at 11:17 AM

## 2020-05-06 ENCOUNTER — HOSPITAL ENCOUNTER (OUTPATIENT)
Dept: GENERAL RADIOLOGY | Facility: CLINIC | Age: 40
End: 2020-05-06
Attending: INTERNAL MEDICINE
Payer: COMMERCIAL

## 2020-05-06 DIAGNOSIS — R76.12 POSITIVE QUANTIFERON-TB GOLD TEST: ICD-10-CM

## 2020-05-06 PROCEDURE — 40000293 XR CHEST 1 VIEW, EMPLOYEE HEALTH

## 2023-06-30 NOTE — PROGRESS NOTES
Patient: Cheli Sanabria   : 1957 MRN: [id number]    Focus: wound  D/I: discussion with bedside RN who states that dressing was saturated this morning and wound base is still \"goopy\".   Orders changed and products ordered from [facility] for discharge     A/P: Biopsy site to Right clavicle area is very moist and draining moderate amount. Will plan to change the dressing to start using Mesalt to assist with drying and cleaning up the wound base.   Discharge POC updated.  " 

## 2024-05-31 ENCOUNTER — APPOINTMENT (OUTPATIENT)
Dept: GENERAL RADIOLOGY | Facility: CLINIC | Age: 44
End: 2024-05-31
Attending: EMERGENCY MEDICINE
Payer: OTHER MISCELLANEOUS

## 2024-05-31 ENCOUNTER — HOSPITAL ENCOUNTER (EMERGENCY)
Facility: CLINIC | Age: 44
Discharge: HOME | End: 2024-06-01
Attending: EMERGENCY MEDICINE | Admitting: EMERGENCY MEDICINE
Payer: OTHER MISCELLANEOUS

## 2024-05-31 VITALS
DIASTOLIC BLOOD PRESSURE: 107 MMHG | OXYGEN SATURATION: 97 % | SYSTOLIC BLOOD PRESSURE: 184 MMHG | HEART RATE: 81 BPM | RESPIRATION RATE: 16 BRPM | TEMPERATURE: 97 F

## 2024-05-31 DIAGNOSIS — M25.422 ELBOW EFFUSION, LEFT: ICD-10-CM

## 2024-05-31 DIAGNOSIS — S01.511A LIP LACERATION, INITIAL ENCOUNTER: ICD-10-CM
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
## 2024-05-31 PROCEDURE — 99285 EMERGENCY DEPT VISIT HI MDM: CPT | Performed by: EMERGENCY MEDICINE

## 2024-05-31 PROCEDURE — 73070 X-RAY EXAM OF ELBOW: CPT | Mod: 26 | Performed by: RADIOLOGY

## 2024-05-31 PROCEDURE — 99283 EMERGENCY DEPT VISIT LOW MDM: CPT | Performed by: EMERGENCY MEDICINE

## 2024-05-31 PROCEDURE — 73070 X-RAY EXAM OF ELBOW: CPT | Mod: LT

## 2024-05-31 ASSESSMENT — COLUMBIA-SUICIDE SEVERITY RATING SCALE - C-SSRS
1. IN THE PAST MONTH, HAVE YOU WISHED YOU WERE DEAD OR WISHED YOU COULD GO TO SLEEP AND NOT WAKE UP?: NO
6. HAVE YOU EVER DONE ANYTHING, STARTED TO DO ANYTHING, OR PREPARED TO DO ANYTHING TO END YOUR LIFE?: NO
2. HAVE YOU ACTUALLY HAD ANY THOUGHTS OF KILLING YOURSELF IN THE PAST MONTH?: NO

## 2024-05-31 ASSESSMENT — ACTIVITIES OF DAILY LIVING (ADL)
ADLS_ACUITY_SCORE: 33
ADLS_ACUITY_SCORE: 33

## 2024-05-31 NOTE — LETTER
June 1, 2024      To Whom It May Concern:      Krystina Kinney was seen in our Emergency Department today, 06/01/24.  I expect her condition to improve over the next 1 days.  She may return to work/school when improved.    Sincerely,        Almita Davis MD

## 2024-05-31 NOTE — LETTER
June 1, 2024      To Whom It May Concern:      Krystina Kinney was seen in our Emergency Department today, 06/01/24.  I expect her condition to improve over the next 2 days.  She may return to work/school when improved.    Sincerely,        Malathi Del Real RN

## 2024-06-01 NOTE — ED PROVIDER NOTES
[place] EMERGENCY DEPARTMENT ([facility])    5/31/24       ED PROVIDER NOTE    History     Chief Complaint   Patient presents with    Laceration     HPI  Krystina Kinney is a 44 year old female with history of HIV and TB who presents with lip laceration following a fall tonight. She reports slipping as she was walking and fell forward landing primarily on her face and arms. Happened tonight around 2000. No LOC. Not anticoagulated. No neck pain. No headache. She reports blood in her mouth with a laceration on her lower lip, also pain in her left upper forearm. No dental pain or loose teeth. She is able to move her arm, but it is tender around her left upper forearm. No pain in her elbow or wrist. She has been taking her HIV meds. No TB symptoms like cough, fever, or weight loss.     Per [facility], last Tdap was in 2015.    Past Medical History  Past Medical History:   Diagnosis Date    Chronic midline low back pain with left-sided sciatica 5/11/2017    Human immunodeficiency virus (HIV) disease (H) 5/11/2017    Pulmonary tuberculosis 2004    diagnosed and treated in [facility]- treatment x 2 months- unknown drug(s) and other details     Past Surgical History:   Procedure Laterality Date    LYMPH NODE BIOPSY Right 2011    Right cervical- Dx:Chronic suppurative lymphadenitis suggestive of tuberculosis- in [facility]     DESCOVY 200-25 MG per tablet  TIVICAY 50 MG tablet      No Known Allergies  Family History  Family History   Problem Relation Age of Onset    Cancer No family hx of      Social History   Social History     Tobacco Use    Smoking status: Never    Smokeless tobacco: Never   Substance Use Topics    Alcohol use: Yes    Drug use: No      Past medical history, past surgical history, medications, allergies, family history, and social history were reviewed with the patient. No additional pertinent items.     A medically appropriate review of systems was performed with pertinent positives and negatives noted in  the HPI, and all other systems negative.    Physical Exam   BP: (!) 184/107  Pulse: 81  Temp: 97  F (36.1  C)  Resp: 16  SpO2: 97 %  Physical Exam  Vitals and nursing note reviewed.   Constitutional:       General: She is not in acute distress.     Appearance: Normal appearance. She is well-developed. She is not ill-appearing or diaphoretic.   HENT:      Head: Normocephalic and atraumatic.      Nose: Nose normal.      Mouth/Throat:      Mouth: Mucous membranes are moist. Lacerations present.      Tongue: No lesions.      Comments: Small 3 mm laceration on inner lower lip. Does not open easily. No active bleeding.   Eyes:      General: No scleral icterus.     Conjunctiva/sclera: Conjunctivae normal.   Cardiovascular:      Rate and Rhythm: Normal rate.   Pulmonary:      Effort: Pulmonary effort is normal. No respiratory distress.      Breath sounds: No stridor.   Abdominal:      General: There is no distension.   Musculoskeletal:         General: Tenderness present. No swelling or deformity.      Left shoulder: Normal.      Left upper arm: Normal.      Left elbow: No swelling, deformity or lacerations. Decreased range of motion. Tenderness present.      Left forearm: Normal.      Left wrist: Normal.        Arms:       Cervical back: Normal range of motion and neck supple. No rigidity.   Skin:     General: Skin is warm and dry.      Coloration: Skin is not jaundiced or pale.   Neurological:      General: No focal deficit present.      Mental Status: She is alert and oriented to person, place, and time.   Psychiatric:         Mood and Affect: Mood normal.         Behavior: Behavior normal.           ED Course, Procedures, & Data      Procedures                Results for orders placed or performed during the hospital encounter of 05/31/24   XR Elbow Left 2 Views     Status: None    Narrative    EXAM: XR ELBOW LEFT 2 VIEWS  LOCATION: [facility]  DATE: 5/31/2024    INDICATION:  arm pain, fall  COMPARISON: None.      Impression    IMPRESSION: Visible anterior and posterior fat pad suggesting a left elbow joint effusion. Fracture not identified, however a radiographically occult fracture possible. Recommend follow-up x-ray in one week for reevaluation.     Medications - No data to display  Labs Ordered and Resulted from Time of ED Arrival to Time of ED Departure - No data to display  XR Elbow Left 2 Views   Final Result   IMPRESSION: Visible anterior and posterior fat pad suggesting a left elbow joint effusion. Fracture not identified, however a radiographically occult fracture possible. Recommend follow-up x-ray in one week for reevaluation.             Critical care was not performed.     Medical Decision Making  The patient's presentation was of high complexity (an acute health issue posing potential threat to life or bodily function).    The patient's evaluation involved:  ordering and/or review of 1 test(s) in this encounter (see separate area of note for details)  independent interpretation of testing performed by another health professional (see separate area of note for details)  discussion of management or test interpretation with another health professional (see separate area of note for details)    The patient's management necessitated only low risk treatment.    Assessment & Plan    Krystina Kinney is a 44 year old female with history of HIV and TB who presents with lip laceration following a fall tonight.    Ddx: mechanical fall, elbow sprain/fracture, lip laceration/contusion    Patient's lip washed out. There is a small flap on the internal buccal mucosa that is well approximated and does not open with manipulation on exam. Do not think sutures indicated. Xray shows likely joint effusion in elbow. Patient able to straighten arm almost completely and has minimal discomfort with ranging of elbow. No bony tenderness to palpation. Discussed with ortho who reviewed images. They felt  it was ok to send in a sling and have patient follow with primary for repeat xrays in a week if still having pain. Discussed mouth rinses to prevent infection and NSAIDS for elbow pain.      I have reviewed the nursing notes. I have reviewed the findings, diagnosis, plan and need for follow up with the patient.    Discharge Medication List as of 6/1/2024 12:30 AM          Final diagnoses:   Lip laceration, initial encounter   Ground-level fall   Elbow effusion, left   I, Christina Maria, am serving as a trained medical scribe to document services personally performed by Almita Davis MD based on the provider's statements to me on May 31, 2024.  This document has been checked and approved by the attending provider.    I, Almita Davis MD, was physically present and have reviewed and verified the accuracy of this note documented by Christina Maria medical scribe.      Almita Davis MD  [facility] EMERGENCY DEPARTMENT  5/31/2024     Almita Davis MD  06/06/24 [id number]

## 2024-06-01 NOTE — ED TRIAGE NOTES
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 Pt states she was walking when she tripped forward and hit her face. Denies loc.

## 2024-06-01 NOTE — ED TRIAGE NOTES
Triage Assessment (Adult)       Row Name 05/31/24 2023          Triage Assessment    Airway WDL WDL        Respiratory WDL    Respiratory WDL WDL        Skin Circulation/Temperature WDL    Skin Circulation/Temperature WDL X        Cardiac WDL    Cardiac WDL WDL        Peripheral/Neurovascular WDL    Peripheral Neurovascular WDL WDL        Cognitive/Neuro/Behavioral WDL    Cognitive/Neuro/Behavioral WDL WDL

## 2024-06-01 NOTE — DISCHARGE INSTRUCTIONS
Please make an appointment to follow up with Your Primary Care Provider in 7-10 days for repeat xrays if you your pain and decreased range of motion does not resolve.    You may take tylenol or ibuprofen for pain.    Rinse your mouth with salt water after eating to prevent food particles from getting in wound.     Return for worsening swelling, redness, pain or drainage which may be a sign of infection.

## 2024-06-19 ENCOUNTER — ANCILLARY PROCEDURE (OUTPATIENT)
Dept: GENERAL RADIOLOGY | Facility: CLINIC | Age: 44
End: 2024-06-19
Attending: NURSE PRACTITIONER
Payer: COMMERCIAL

## 2024-06-19 ENCOUNTER — OFFICE VISIT (OUTPATIENT)
Dept: PEDIATRICS | Facility: CLINIC | Age: 44
End: 2024-06-19
Payer: COMMERCIAL

## 2024-06-19 ENCOUNTER — TELEPHONE (OUTPATIENT)
Dept: PEDIATRICS | Facility: CLINIC | Age: 44
End: 2024-06-19

## 2024-06-19 ENCOUNTER — TELEPHONE (OUTPATIENT)
Dept: ORTHOPEDICS | Facility: CLINIC | Age: 44
End: 2024-06-19

## 2024-06-19 VITALS
HEART RATE: 67 BPM | RESPIRATION RATE: 16 BRPM | DIASTOLIC BLOOD PRESSURE: 90 MMHG | SYSTOLIC BLOOD PRESSURE: 152 MMHG | TEMPERATURE: 97 F | HEIGHT: 64 IN | BODY MASS INDEX: 26.91 KG/M2 | OXYGEN SATURATION: 99 % | WEIGHT: 157.6 LBS

## 2024-06-19 DIAGNOSIS — S59.902A ELBOW INJURY, LEFT, INITIAL ENCOUNTER: ICD-10-CM
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
## 2024-06-19 DIAGNOSIS — M25.422 PAIN AND SWELLING OF ELBOW, LEFT: ICD-10-CM

## 2024-06-19 DIAGNOSIS — S49.92XA SHOULDER INJURY, LEFT, INITIAL ENCOUNTER: ICD-10-CM

## 2024-06-19 DIAGNOSIS — M25.512 ACUTE PAIN OF LEFT SHOULDER: ICD-10-CM

## 2024-06-19 DIAGNOSIS — S42.402A CLOSED FRACTURE OF LEFT ELBOW, INITIAL ENCOUNTER: Primary | ICD-10-CM

## 2024-06-19 DIAGNOSIS — M25.522 PAIN AND SWELLING OF ELBOW, LEFT: ICD-10-CM

## 2024-06-19 DIAGNOSIS — M79.642 PAIN OF LEFT HAND: ICD-10-CM

## 2024-06-19 PROCEDURE — 99204 OFFICE O/P NEW MOD 45 MIN: CPT | Performed by: NURSE PRACTITIONER

## 2024-06-19 PROCEDURE — 73130 X-RAY EXAM OF HAND: CPT | Mod: TC | Performed by: RADIOLOGY

## 2024-06-19 PROCEDURE — 73110 X-RAY EXAM OF WRIST: CPT | Mod: TC | Performed by: RADIOLOGY

## 2024-06-19 PROCEDURE — 73080 X-RAY EXAM OF ELBOW: CPT | Mod: TC | Performed by: RADIOLOGY

## 2024-06-19 PROCEDURE — G2211 COMPLEX E/M VISIT ADD ON: HCPCS | Performed by: NURSE PRACTITIONER

## 2024-06-19 PROCEDURE — 73030 X-RAY EXAM OF SHOULDER: CPT | Mod: TC | Performed by: RADIOLOGY

## 2024-06-19 RX ORDER — IBUPROFEN 600 MG/1
600 TABLET, FILM COATED ORAL EVERY 6 HOURS PRN
Qty: 30 TABLET | Refills: 0 | Status: SHIPPED | OUTPATIENT
Start: 2024-06-19

## 2024-06-19 ASSESSMENT — PAIN SCALES - GENERAL: PAINLEVEL: MILD PAIN (3)

## 2024-06-19 NOTE — PROGRESS NOTES
Assessment & Plan     Fall, initial encounter  Shoulder injury, left, initial encounter  Acute pain of left shoulder  Elbow injury, left, initial encounter  Pain and swelling of elbow, left  Pain of left hand  Injury 5/31/2024.  Symptoms have not improved.    Xrays done today  Follow-up as needed.    Can refer to orthopedics and PT if needed  Ibuprofen given to take as needed.  Work note given.  RICE elbow.  - XR Elbow Left G/E 3 Views  - XR Shoulder Left 2 Views  - XR Hand Left G/E 3 Views  - XR Wrist Left G/E 3 Views        Follow-up:   RTC for routine preventative and to address blood pressure.    Zeynep lFaherty is a 44 year old, presenting for the following health issues:    Musculoskeletal Problem (Left arm and hand pain.)        6/19/2024     8:05 AM   Additional Questions   Roomed by Lindy   Accompanied by self         6/19/2024     8:05 AM   Patient Reported Additional Medications   Patient reports taking the following new medications no     History of Present Illness       Reason for visit:  Left arm and hand pain from a fall.  Symptom onset:  1-2 weeks ago  Symptoms include:  Numbness after I fell and it turned into pain.  Symptom intensity:  Mild  Symptom progression:  Worsening  Had these symptoms before:  No  What makes it worse:  Using my hand.    She eats 2-3 servings of fruits and vegetables daily.She consumes 0 sweetened beverage(s) daily.She exercises with enough effort to increase her heart rate 30 to 60 minutes per day.  She exercises with enough effort to increase her heart rate 3 or less days per week.   She is taking medications regularly.       Left arm and hand pain:  -Injury 5/31/2024.  Patient fell forward and braced herself with her left arm and hand.   States she slipped on water on the floor  -Pain starts at left shoulder and down to left hand.  -5/10.  -Aggravating factors: when patient moves arm  -Alleviating factors: ice and rest.  -Left elbow swelling on medial epicondyle  "  -LROM  -Difficulty performing ADLs  -ER visit 5/31/2024.  Xray done and suggested a 1 week follow-up for possible occult fracture.  -No numbness or tingling      Review of Systems  Constitutional, HEENT, cardiovascular, pulmonary, gi and gu systems are negative, except as otherwise noted.      Objective    BP (!) 152/90 (BP Location: Right arm, Patient Position: Sitting, Cuff Size: Adult Regular)   Pulse 67   Temp 97  F (36.1  C) (Tympanic)   Resp 16   Ht 1.626 m (5' 4\")   Wt 71.5 kg (157 lb 9.6 oz)   LMP  (LMP Unknown)   SpO2 99%   BMI 27.05 kg/m    Body mass index is 27.05 kg/m .    Physical Exam   GENERAL: alert and no distress  RESP: lungs clear to auscultation - no rales, rhonchi or wheezes  CV: regular rate and rhythm, normal S1 S2, no S3 or S4, no murmur, click or rub, no peripheral edema  MS: LUE exam shows    Hand: FROM.  Pain at base of thumb.  No swelling   Wrist: FROM.  Pain with ROM.   Elbow: LROM.  Pain with ROM.  Swelling at medial epicondyle.  Pain upon palpation   Shoulder.  LROM.  Pain with ROM.  No swelling.  Pain upon palpation.  PSYCH: mentation appears normal, affect normal/bright    Signed Electronically by: Tiffany Hughes NP    "

## 2024-06-19 NOTE — TELEPHONE ENCOUNTER
Other: pt has appt 06/24 for Closed fracture of left elbow, initial encounter / Tiffany Hughes NP / uhc / xr on file. Please review and eval if ok to see.     Could we send this information to you in [employer] or would you prefer to receive a phone call?:   Patient would prefer a phone call   Okay to leave a detailed message?: Yes at Cell number on file:    Telephone Information:   Mobile [phone]

## 2024-06-19 NOTE — TELEPHONE ENCOUNTER
----- Message from Tiffany Hughes sent at 6/19/2024  1:34 PM CDT -----  1) Referral for orthopedics placed.  Please call to get appointment this week/ASAP.  2) Please call patient and discuss results below.  3) She will need extended time off of work which can be discussed with ortho.  I can write the note if needed.    Dear Krystina,    Your elbow xray does show a fracture as well as a left 2nd finger non-displaced fracture.     I have referred you to orthopedics.   Our RN's are working on getting you an appointment.    Please let me know if you have any questions,    QUINCY Hughes, ANA  Family Medicine

## 2024-06-19 NOTE — TELEPHONE ENCOUNTER
"RN attempted to reach patient. [facility] and speak with nurse.     When patient returns call:     -Relay result note: \"Your elbow xray does show a fracture as well as a left 2nd finger non-displaced fracture. I have referred you to orthopedics.\"    - provide phone number for patient to call and get seen this week/ASAP: [phone]     -she will need extended time off of work which can be discussed with ortho. Tiffany can write the note if needed.       Jazzmine SEE, RN on 6/19/2024 at 1:45 PM     "

## 2024-06-19 NOTE — PATIENT INSTRUCTIONS
It was nice seeing you today.    Please let me know if you have any questions regarding today's visit!    Take care,    QUINCY Hughes DNP  Family Medicine

## 2024-06-19 NOTE — LETTER
June 19, 2024      Krystina Kinney  [address] [place] [address]        To Whom It May Concern:    Krystina Kinney was seen in our clinic. She may return to work Tuesday, June 25th without restrictions.      Sincerely,        Tiffany Hughes NP

## 2024-06-20 ENCOUNTER — APPOINTMENT (OUTPATIENT)
Dept: INTERPRETER SERVICES | Facility: CLINIC | Age: 44
End: 2024-06-20
Payer: COMMERCIAL

## 2024-06-20 NOTE — TELEPHONE ENCOUNTER
Call placed to pt with an [employer]   LM to return call to the clinic     Iban Mcclelland RN on 6/20/2024 at 9:08 AM

## 2024-06-20 NOTE — TELEPHONE ENCOUNTER
Left voicemail using  services asking for patient to return call to discuss appointment.     If patient returns call, ok to keep appointment scheduled with Dr. Paul for Monday or she can reschedule to see sports medicine provider instead if it works better with her schedule.     Leny Patel, ATC

## 2024-06-21 NOTE — TELEPHONE ENCOUNTER
Left message for patient to call back x 3.  Will need to send letter if patient doesn't call back. Pending encounter for Monday.  Janee ADAME RN, BSN

## 2024-06-21 NOTE — TELEPHONE ENCOUNTER
2nd attempt to reach patient via  services. Left voicemail asking that she keep her appointment on 6/24/24 and to check in at 3:30 pm with Dr. Paul at our [place] location.   Left  number for call back if has any questions.     DEEP [place] RN

## 2024-06-24 ENCOUNTER — VIRTUAL VISIT (OUTPATIENT)
Dept: INTERPRETER SERVICES | Facility: CLINIC | Age: 44
End: 2024-06-24

## 2024-06-24 ENCOUNTER — OFFICE VISIT (OUTPATIENT)
Dept: ORTHOPEDICS | Facility: CLINIC | Age: 44
End: 2024-06-24
Attending: NURSE PRACTITIONER
Payer: OTHER MISCELLANEOUS

## 2024-06-24 ENCOUNTER — ANCILLARY PROCEDURE (OUTPATIENT)
Dept: GENERAL RADIOLOGY | Facility: CLINIC | Age: 44
End: 2024-06-24
Attending: STUDENT IN AN ORGANIZED HEALTH CARE EDUCATION/TRAINING PROGRAM
Payer: COMMERCIAL

## 2024-06-24 VITALS — BODY MASS INDEX: 26.8 KG/M2 | WEIGHT: 157 LBS | HEIGHT: 64 IN

## 2024-06-24 DIAGNOSIS — S42.402A CLOSED FRACTURE OF LEFT ELBOW, INITIAL ENCOUNTER: ICD-10-CM

## 2024-06-24 PROCEDURE — 99203 OFFICE O/P NEW LOW 30 MIN: CPT | Performed by: STUDENT IN AN ORGANIZED HEALTH CARE EDUCATION/TRAINING PROGRAM

## 2024-06-24 PROCEDURE — 73080 X-RAY EXAM OF ELBOW: CPT | Mod: TC | Performed by: RADIOLOGY

## 2024-06-24 NOTE — PROGRESS NOTES
CC: Left elbow injury    HPI: A virtual  was utilized for this visit and we appreciate their assistance.    Patient is a 44-year-old female seen here today for a subacute left elbow injury.  She fell on May 31.  Since that time she has had pain of the left elbow.  She was seen at an outside urgent care.  She was diagnosed with a nondisplaced radial head fracture.  She has been gradually progressing her active and passive range of motion.  She is not in a sling or splint.  She states that the pain continues to improve.  She feels she has regained almost full range of motion.  She notes occasional soreness over the distal aspect of the biceps and brachialis.  She denies any feelings of instability.  She denies any prior surgery.  She takes ibuprofen as needed for the pain.    She is otherwise healthy.  She is a homemaker.  She does not smoke.  She enjoys cooking and walking for exercise.         Patient Active Problem List   Diagnosis    Human immunodeficiency virus (HIV) disease (H)    Chronic midline low back pain with left-sided sciatica    Tuberculous lymphadenitis    Disseminated tuberculosis          Past Medical History:   Diagnosis Date    Chronic midline low back pain with left-sided sciatica 5/11/2017    Human immunodeficiency virus (HIV) disease (H) 5/11/2017    Pulmonary tuberculosis 2004    diagnosed and treated in [facility]- treatment x 2 months- unknown drug(s) and other details          Past Surgical History:   Procedure Laterality Date    LYMPH NODE BIOPSY Right 2011    Right cervical- Dx:Chronic suppurative lymphadenitis suggestive of tuberculosis- in [facility]          Current Outpatient Medications   Medication Sig Dispense Refill    DESCOVY 200-25 MG per tablet Take 1 tablet by mouth daily  2    ibuprofen (ADVIL/MOTRIN) 600 MG tablet Take 1 tablet (600 mg) by mouth every 6 hours as needed for moderate pain 30 tablet 0    TIVICAY 50 MG tablet Take 50 mg by mouth daily  0        No  Known Allergies       Family History   Problem Relation Age of Onset    Cancer No family hx of           Social History     Tobacco Use    Smoking status: Never    Smokeless tobacco: Never   Substance Use Topics    Alcohol use: Yes            Objective:  Physical Exam:  LUE: Free and painless range of motion of the shoulder.  No ecchymosis about the elbow.  No significant edema about the elbow.  No pain to palpation over the olecranon or triceps insertion.  No pain to palpation over the medial or lateral condyles.  No significant pain to palpation over the radial head.  Mild pain to palpation over the distal biceps muscle belly and distal brachialis.  Negative hook test with intact palpable biceps tendon.  Passive range of motion full extension to 120 degrees of flexion.  90 degrees of pronation to 90 degrees of supination.  Active range of motion is equivalent to passive range of motion.  5/5 elbow flexion and extension.    Imaging:  3 view x-ray from today shows a subacute versus healed radial neck fracture with less than 10 degrees of angulation.  No other fracture or acute bony pathology noted.  Physiologic anterior fat pad.    Assessment and Plan: Patient is a 44-year-old female seen here today with a subacute right elbow injury.  On x-ray she has a subacute versus chronic minimally displaced radial neck fracture that is gone on to union.  No other bony pathology noted.  On exam, she has some irritation of the distal biceps myotendinous junction.  Her biceps tendon is intact by hook test today.  I discussed with her that at this time I would not restrict her active and passive range of motion.  I think physical therapy for some elbow strengthening would be beneficial to her.  She is in agreement with this.  I will prescribe her once a week for 3 to 4 weeks.  I will follow-up with her in 4 to 5 weeks in clinic.      Ed Paul MD    [facility]   Department of Orthopedic  Surgery    Disclaimer: This note consists of symbols derived from keyboarding, dictation and/or voice recognition software. As a result, there may be errors in the script that have gone undetected. Please consider this when interpreting information found in this chart.

## 2024-06-24 NOTE — PATIENT INSTRUCTIONS
[facility]   [id number] [place] [address], [address]  [place] [address] [address] [place] [address]   Appointments: [phone] Appointments: [phone]   Fax: [phone] Fax: [phone]       1. Closed fracture of left elbow, initial encounter        PHYSICAL THERAPY:  Physical Therapy orders have been placed with [facility].  You can call [phone] to schedule at your convenience.     Follow up with Dr. Paul in 6 weeks .    Call my office with any questions or concerns, [phone].

## 2024-06-24 NOTE — TELEPHONE ENCOUNTER
Per chart review, patient has appointment 6/24/24 w/ Dr. Paul orthopedics.     Joaquin BREWER RN 6/24/2024 at 9:05 AM

## 2024-06-24 NOTE — LETTER
6/24/2024      Krystina Kinney  [address] [place] [address]      Dear Colleague,    Thank you for referring your patient, Krystina Kinney, to the [facility] [place]. Please see a copy of my visit note below.    CC: Left elbow injury    HPI: A virtual  was utilized for this visit and we appreciate their assistance.    Patient is a 44-year-old female seen here today for a subacute left elbow injury.  She fell on May 31.  Since that time she has had pain of the left elbow.  She was seen at an outside urgent care.  She was diagnosed with a nondisplaced radial head fracture.  She has been gradually progressing her active and passive range of motion.  She is not in a sling or splint.  She states that the pain continues to improve.  She feels she has regained almost full range of motion.  She notes occasional soreness over the distal aspect of the biceps and brachialis.  She denies any feelings of instability.  She denies any prior surgery.  She takes ibuprofen as needed for the pain.    She is otherwise healthy.  She is a homemaker.  She does not smoke.  She enjoys cooking and walking for exercise.         Patient Active Problem List   Diagnosis     Human immunodeficiency virus (HIV) disease (H)     Chronic midline low back pain with left-sided sciatica     Tuberculous lymphadenitis     Disseminated tuberculosis          Past Medical History:   Diagnosis Date     Chronic midline low back pain with left-sided sciatica 5/11/2017     Human immunodeficiency virus (HIV) disease (H) 5/11/2017     Pulmonary tuberculosis 2004    diagnosed and treated in [facility]- treatment x 2 months- unknown drug(s) and other details          Past Surgical History:   Procedure Laterality Date     LYMPH NODE BIOPSY Right 2011    Right cervical- Dx:Chronic suppurative lymphadenitis suggestive of tuberculosis- in [facility]          Current Outpatient Medications   Medication Sig Dispense Refill      OFFICE VISIT      Patient: Ilene Frank Date of Service: 2023   : 1981 MRN: [id number]     Chief Complaint   Patient presents with   • Follow-up     Wegovy f/u       HPI    Patient is a 42 yr old F w/ h/o unprovoked PE on indefinite AC presenting today for f/u     -started Wegovy couple months ago   -currently on 1mg weekly   -tolerating w/o issue  -lost about 6lbs since starting - doesn't think she's losing as much as expected     Last visit:   -stopped smoking - last year  -been gaining weight since- about 40lbs since then   -been snacking more but has been trying to address diet w/ different methods including keto   -also been doing light exercises including walking but difficult w/ hip and foot pain   -never tried anything else  -no family h/o MTC or MEN   -no personal h/o pancreatitis     Current Outpatient Medications   Medication Sig Dispense Refill   • ferrous sulfate 325 (65 FE) MG tablet Take 1 tablet by mouth every other day. 45 tablet 3   • apixaBAN (Eliquis) 2.5 MG Tab Take 1 tablet by mouth in the morning and 1 tablet in the evening. 180 tablet 3   • Semaglutide-Weight Management (WEGOVY) 0.5 MG/0.5ML Solution Auto-injector Inject 0.5 mg into the skin every 7 days. 2 mL 0   • Semaglutide-Weight Management (Wegovy) 1 MG/0.5ML Solution Auto-injector Inject 1 mg into the skin every 7 days. Do not start before May 22, 2023. 2 mL 0   • acetaminophen (TYLENOL) 325 MG tablet Take 2 tablets by mouth every 6 hours as needed for Pain. 30 tablet 0   • Multiple Vitamin (MULTIVITAMIN ADULT PO) Take 1 tablet by mouth daily.       No current facility-administered medications for this visit.         Patient's medications, allergies, past medical, surgical, social and family histories were reviewed and updated as appropriate.    Review of Systems  All other ROS reviewed negative except as mentioned in HPI     Visit Vitals  LMP  (LMP Unknown)         Physical Exam  Vitals reviewed.   Constitutional:        General: She is not in acute distress.     Appearance: Normal appearance. She is not ill-appearing, toxic-appearing or diaphoretic.   HENT:      Head: Normocephalic.   Eyes:      Extraocular Movements: Extraocular movements intact.      Conjunctiva/sclera: Conjunctivae normal.   Pulmonary:      Effort: Pulmonary effort is normal. No respiratory distress.   Neurological:      General: No focal deficit present.      Mental Status: She is alert and oriented to person, place, and time.   Psychiatric:         Mood and Affect: Mood normal.         ASSESSMENT:     (R73.02) Impaired glucose tolerance  (primary encounter diagnosis)  Plan: Semaglutide-Weight Management (WEGOVY) 1.7         MG/0.75ML Solution Auto-injector,         Semaglutide-Weight Management (WEGOVY) 2.4         MG/0.75ML Solution Auto-injector    (E66.3) Overweight  Plan: Semaglutide-Weight Management (WEGOVY) 1.7         MG/0.75ML Solution Auto-injector,         Semaglutide-Weight Management (WEGOVY) 2.4         MG/0.75ML Solution Auto-injector    -currently doing well on wegovy   -titrate up to 1.7mg once 1mg dose finished   -2.4mg dose also sent to pharmacy as pt leaving for vacation tomorrow - [place] be [place] 1[place]5 [place]   -CMP recently done w/ hematologist reviewed   -f/u in 4 months     Red flag s/s reviewed and discussed for conditions listed including concerns for prompt ED evaluation  Medical compliance with plan discussed and risks of non-compliance reviewed  Education completed on disease process, etiology & prognosis  Proper usage and side effects of medications reviewed & discussed      This note was created using the Dragon voice recognition system. Errors in content may be related to improper recognition of the system. Effort to review and correct the note has been made but irregularities may still be present.     The 21st Century Cures Act requires that medical notes like this be available to patients in the interest of  DESCOVY 200-25 MG per tablet Take 1 tablet by mouth daily  2     ibuprofen (ADVIL/MOTRIN) 600 MG tablet Take 1 tablet (600 mg) by mouth every 6 hours as needed for moderate pain 30 tablet 0     TIVICAY 50 MG tablet Take 50 mg by mouth daily  0        No Known Allergies       Family History   Problem Relation Age of Onset     Cancer No family hx of           Social History     Tobacco Use     Smoking status: Never     Smokeless tobacco: Never   Substance Use Topics     Alcohol use: Yes            Objective:  Physical Exam:  LUE: Free and painless range of motion of the shoulder.  No ecchymosis about the elbow.  No significant edema about the elbow.  No pain to palpation over the olecranon or triceps insertion.  No pain to palpation over the medial or lateral condyles.  No significant pain to palpation over the radial head.  Mild pain to palpation over the distal biceps muscle belly and distal brachialis.  Negative hook test with intact palpable biceps tendon.  Passive range of motion full extension to 120 degrees of flexion.  90 degrees of pronation to 90 degrees of supination.  Active range of motion is equivalent to passive range of motion.  5/5 elbow flexion and extension.    Imaging:  3 view x-ray from today shows a subacute versus healed radial neck fracture with less than 10 degrees of angulation.  No other fracture or acute bony pathology noted.  Physiologic anterior fat pad.    Assessment and Plan: Patient is a 44-year-old female seen here today with a subacute right elbow injury.  On x-ray she has a subacute versus chronic minimally displaced radial neck fracture that is gone on to union.  No other bony pathology noted.  On exam, she has some irritation of the distal biceps myotendinous junction.  Her biceps tendon is intact by hook test today.  I discussed with her that at this time I would not restrict her active and passive range of motion.  I think physical therapy for some elbow strengthening would be  beneficial to her.  She is in agreement with this.  I will prescribe her once a week for 3 to 4 weeks.  I will follow-up with her in 4 to 5 weeks in clinic.      Ed Paul MD    [facility]   Department of Orthopedic Surgery    Disclaimer: This note consists of symbols derived from keyboarding, dictation and/or voice recognition software. As a result, there may be errors in the script that have gone undetected. Please consider this when interpreting information found in this chart.         Again, thank you for allowing me to participate in the care of your patient.        Sincerely,        Ed Paul MD   transparency. However, be advised this is a medical document. It is intended as peer to peer communication. It is written in medical language and may contain abbreviations or verbiage that are unfamiliar. It may appear blunt or direct. Medical documents are intended to carry relevant information, facts as evident, and the clinical opinion of the practitioner.      Hair Hussein, DO  Family Medicine      [phone] [place] [id number]

## 2024-08-07 ENCOUNTER — TELEPHONE (OUTPATIENT)
Dept: ORTHOPEDICS | Facility: CLINIC | Age: 44
End: 2024-08-07
Payer: COMMERCIAL

## 2024-08-07 NOTE — TELEPHONE ENCOUNTER
LVM 8/7 with [facility] that appt on 8/16 is cx due to provider out of clinic. Asked for them to call us back and reschedule.